# Patient Record
Sex: MALE | Race: ASIAN | Employment: FULL TIME | URBAN - METROPOLITAN AREA
[De-identification: names, ages, dates, MRNs, and addresses within clinical notes are randomized per-mention and may not be internally consistent; named-entity substitution may affect disease eponyms.]

---

## 2023-11-20 ENCOUNTER — APPOINTMENT (OUTPATIENT)
Dept: CT IMAGING | Facility: HOSPITAL | Age: 38
End: 2023-11-20
Attending: EMERGENCY MEDICINE
Payer: COMMERCIAL

## 2023-11-20 ENCOUNTER — HOSPITAL ENCOUNTER (EMERGENCY)
Facility: HOSPITAL | Age: 38
Discharge: HOME | End: 2023-11-20
Attending: EMERGENCY MEDICINE
Payer: COMMERCIAL

## 2023-11-20 ENCOUNTER — HOSPITAL ENCOUNTER (OUTPATIENT)
Age: 38
Discharge: ANOTHER HOSPITAL | End: 2023-11-20
Payer: COMMERCIAL

## 2023-11-20 VITALS
RESPIRATION RATE: 18 BRPM | TEMPERATURE: 98 F | DIASTOLIC BLOOD PRESSURE: 84 MMHG | BODY MASS INDEX: 30.77 KG/M2 | OXYGEN SATURATION: 100 % | HEIGHT: 68 IN | SYSTOLIC BLOOD PRESSURE: 131 MMHG | WEIGHT: 203 LBS | HEART RATE: 67 BPM

## 2023-11-20 VITALS
HEIGHT: 68 IN | TEMPERATURE: 98 F | HEART RATE: 90 BPM | OXYGEN SATURATION: 98 % | RESPIRATION RATE: 18 BRPM | BODY MASS INDEX: 30.41 KG/M2 | DIASTOLIC BLOOD PRESSURE: 90 MMHG | SYSTOLIC BLOOD PRESSURE: 134 MMHG | WEIGHT: 200.63 LBS

## 2023-11-20 DIAGNOSIS — K61.0 PERIANAL ABSCESS: Primary | ICD-10-CM

## 2023-11-20 DIAGNOSIS — K61.1 PERIRECTAL ABSCESS: Primary | ICD-10-CM

## 2023-11-20 LAB
ALBUMIN SERPL-MCNC: 3.9 G/DL (ref 3.4–5)
ALBUMIN/GLOB SERPL: 1 {RATIO} (ref 1–2)
ALP LIVER SERPL-CCNC: 88 U/L
ALT SERPL-CCNC: 38 U/L
ANION GAP SERPL CALC-SCNC: 6 MMOL/L (ref 0–18)
APTT PPP: 28.5 SECONDS (ref 23.3–35.6)
AST SERPL-CCNC: 27 U/L (ref 15–37)
BASOPHILS # BLD AUTO: 0.03 X10(3) UL (ref 0–0.2)
BASOPHILS NFR BLD AUTO: 0.5 %
BILIRUB SERPL-MCNC: 0.6 MG/DL (ref 0.1–2)
BUN BLD-MCNC: 9 MG/DL (ref 9–23)
CALCIUM BLD-MCNC: 9.2 MG/DL (ref 8.5–10.1)
CHLORIDE SERPL-SCNC: 105 MMOL/L (ref 98–112)
CO2 SERPL-SCNC: 25 MMOL/L (ref 21–32)
CREAT BLD-MCNC: 0.9 MG/DL
EGFRCR SERPLBLD CKD-EPI 2021: 113 ML/MIN/1.73M2 (ref 60–?)
EOSINOPHIL # BLD AUTO: 0.12 X10(3) UL (ref 0–0.7)
EOSINOPHIL NFR BLD AUTO: 1.9 %
ERYTHROCYTE [DISTWIDTH] IN BLOOD BY AUTOMATED COUNT: 12.9 %
GLOBULIN PLAS-MCNC: 4 G/DL (ref 2.8–4.4)
GLUCOSE BLD-MCNC: 85 MG/DL (ref 70–99)
HCT VFR BLD AUTO: 44.3 %
HGB BLD-MCNC: 14.8 G/DL
IMM GRANULOCYTES # BLD AUTO: 0.01 X10(3) UL (ref 0–1)
IMM GRANULOCYTES NFR BLD: 0.2 %
INR BLD: 1.07 (ref 0.8–1.2)
LYMPHOCYTES # BLD AUTO: 1.56 X10(3) UL (ref 1–4)
LYMPHOCYTES NFR BLD AUTO: 25.3 %
MCH RBC QN AUTO: 26.5 PG (ref 26–34)
MCHC RBC AUTO-ENTMCNC: 33.4 G/DL (ref 31–37)
MCV RBC AUTO: 79.4 FL
MONOCYTES # BLD AUTO: 0.59 X10(3) UL (ref 0.1–1)
MONOCYTES NFR BLD AUTO: 9.6 %
NEUTROPHILS # BLD AUTO: 3.86 X10 (3) UL (ref 1.5–7.7)
NEUTROPHILS # BLD AUTO: 3.86 X10(3) UL (ref 1.5–7.7)
NEUTROPHILS NFR BLD AUTO: 62.5 %
OSMOLALITY SERPL CALC.SUM OF ELEC: 280 MOSM/KG (ref 275–295)
PLATELET # BLD AUTO: 249 10(3)UL (ref 150–450)
POTASSIUM SERPL-SCNC: 4.1 MMOL/L (ref 3.5–5.1)
PROT SERPL-MCNC: 7.9 G/DL (ref 6.4–8.2)
PROTHROMBIN TIME: 13.9 SECONDS (ref 11.6–14.8)
RBC # BLD AUTO: 5.58 X10(6)UL
SODIUM SERPL-SCNC: 136 MMOL/L (ref 136–145)
WBC # BLD AUTO: 6.2 X10(3) UL (ref 4–11)

## 2023-11-20 PROCEDURE — 85610 PROTHROMBIN TIME: CPT | Performed by: EMERGENCY MEDICINE

## 2023-11-20 PROCEDURE — 99215 OFFICE O/P EST HI 40 MIN: CPT | Performed by: PHYSICIAN ASSISTANT

## 2023-11-20 PROCEDURE — 96361 HYDRATE IV INFUSION ADD-ON: CPT

## 2023-11-20 PROCEDURE — 80053 COMPREHEN METABOLIC PANEL: CPT | Performed by: EMERGENCY MEDICINE

## 2023-11-20 PROCEDURE — 74177 CT ABD & PELVIS W/CONTRAST: CPT | Performed by: EMERGENCY MEDICINE

## 2023-11-20 PROCEDURE — 85025 COMPLETE CBC W/AUTO DIFF WBC: CPT | Performed by: EMERGENCY MEDICINE

## 2023-11-20 PROCEDURE — 85730 THROMBOPLASTIN TIME PARTIAL: CPT | Performed by: EMERGENCY MEDICINE

## 2023-11-20 PROCEDURE — 96365 THER/PROPH/DIAG IV INF INIT: CPT

## 2023-11-20 PROCEDURE — 82272 OCCULT BLD FECES 1-3 TESTS: CPT

## 2023-11-20 PROCEDURE — 99284 EMERGENCY DEPT VISIT MOD MDM: CPT

## 2023-11-20 PROCEDURE — 96375 TX/PRO/DX INJ NEW DRUG ADDON: CPT

## 2023-11-20 RX ORDER — AMOXICILLIN AND CLAVULANATE POTASSIUM 875; 125 MG/1; MG/1
1 TABLET, FILM COATED ORAL 2 TIMES DAILY
Qty: 20 TABLET | Refills: 0 | Status: SHIPPED | OUTPATIENT
Start: 2023-11-20 | End: 2023-11-30

## 2023-11-20 RX ORDER — KETOROLAC TROMETHAMINE 15 MG/ML
30 INJECTION, SOLUTION INTRAMUSCULAR; INTRAVENOUS ONCE
Status: COMPLETED | OUTPATIENT
Start: 2023-11-20 | End: 2023-11-20

## 2023-11-20 RX ORDER — HYDROCODONE BITARTRATE AND ACETAMINOPHEN 5; 325 MG/1; MG/1
1 TABLET ORAL EVERY 6 HOURS PRN
Qty: 10 TABLET | Refills: 0 | Status: SHIPPED | OUTPATIENT
Start: 2023-11-20

## 2023-11-20 RX ORDER — SODIUM CHLORIDE 9 MG/ML
1000 INJECTION, SOLUTION INTRAVENOUS ONCE
Status: COMPLETED | OUTPATIENT
Start: 2023-11-20 | End: 2023-11-20

## 2023-11-20 NOTE — DISCHARGE INSTRUCTIONS
Do not eat or drink   Report directly to the ER - concern for perirectal abscess, although already draining imagining may be necessary to assess the extent of the swelling

## 2023-11-20 NOTE — ED INITIAL ASSESSMENT (HPI)
Seen & treated at another IC 10/28. Hx of constipation also hemorrhoids. Has been using Hydrocortisone cream & lidocaine ointment. Also stool softner. Meera Elkins to another [facility] ED earlier today but left prior to discharge got tired of waiting. .  Still having rectal pain w bleeding.   Denies N/V/D or UTI issues

## 2023-11-21 NOTE — DISCHARGE INSTRUCTIONS
Soak in warm sitz baths several times a day at home  Motrin 600 mg every 6 hours for pain at home  Return to the ER if symptoms worsen or if any other problems arise

## 2023-11-22 ENCOUNTER — OFFICE VISIT (OUTPATIENT)
Dept: SURGERY | Facility: CLINIC | Age: 38
End: 2023-11-22
Payer: COMMERCIAL

## 2023-11-22 VITALS — TEMPERATURE: 97 F | HEART RATE: 85 BPM

## 2023-11-22 DIAGNOSIS — K61.0 PERIANAL ABSCESS: Primary | ICD-10-CM

## 2023-11-22 PROCEDURE — 46050 I&D PERIANAL ABSCESS SUPFC: CPT | Performed by: SURGERY

## 2023-11-22 PROCEDURE — 99244 OFF/OP CNSLTJ NEW/EST MOD 40: CPT | Performed by: SURGERY

## 2023-11-23 PROBLEM — K61.0 PERIANAL ABSCESS: Status: ACTIVE | Noted: 2023-11-23

## 2023-11-24 ENCOUNTER — TELEPHONE (OUTPATIENT)
Facility: LOCATION | Age: 38
End: 2023-11-24

## 2023-11-24 DIAGNOSIS — R19.5 WATERY STOOLS: Primary | ICD-10-CM

## 2023-11-24 NOTE — TELEPHONE ENCOUNTER
Brian Enamorado underwent incision and drainage of perirectal abscess in the office on 11/22/23 with Dr. Clay Owens. The patient was prescribed Augmentin while in the hospital on 11/20/23. Dr. Clay Owens recommended continuing the medication as prescribed after his office procedure. The patient states he began to experience loose and completely watery bowel movements 2 days ago. He reports having 4-5 bowel movements a day. He stopped taking the Augmentin when the watery bowel movements began. Today, he reports normal formed stool. He denies taking antibiotics today. He denies fevers or chills. An order for C. Diff stool sample was placed. I advised the patient to complete this today. We will notify him of the next steps based off of the results. All of the patient's questions and concerns were addressed. He expressed his understanding and is in agreement with this plan.      Omid Love PA-C

## 2023-11-24 NOTE — TELEPHONE ENCOUNTER
Good morning,  The patient recently called in about the medication that he was prescribed while at the ER on 11/20/23 and when he came in to see . The patient states that the medication is causing him to have diarrhea and he was wondering if there was a substitute that he can take.     Reason for Visit : Perianal Abscess    Medication : Amoxicillin Clavulanate 875-125 MG Oral Tab     Call back # [phone]

## 2023-11-29 ENCOUNTER — OFFICE VISIT (OUTPATIENT)
Facility: LOCATION | Age: 38
End: 2023-11-29
Payer: COMMERCIAL

## 2023-11-29 VITALS — HEART RATE: 83 BPM | TEMPERATURE: 98 F

## 2023-11-29 DIAGNOSIS — K61.0 PERIANAL ABSCESS: Primary | ICD-10-CM

## 2023-11-29 PROCEDURE — 99212 OFFICE O/P EST SF 10 MIN: CPT | Performed by: SURGERY

## 2023-12-12 ENCOUNTER — OFFICE VISIT (OUTPATIENT)
Dept: FAMILY MEDICINE CLINIC | Facility: CLINIC | Age: 38
End: 2023-12-12
Payer: COMMERCIAL

## 2023-12-12 VITALS
OXYGEN SATURATION: 99 % | HEIGHT: 68 IN | HEART RATE: 81 BPM | SYSTOLIC BLOOD PRESSURE: 122 MMHG | DIASTOLIC BLOOD PRESSURE: 84 MMHG | BODY MASS INDEX: 30.16 KG/M2 | WEIGHT: 199 LBS

## 2023-12-12 DIAGNOSIS — Z23 NEED FOR VACCINATION: ICD-10-CM

## 2023-12-12 DIAGNOSIS — Z00.00 ANNUAL PHYSICAL EXAM: Primary | ICD-10-CM

## 2023-12-12 DIAGNOSIS — G47.33 OSA (OBSTRUCTIVE SLEEP APNEA): ICD-10-CM

## 2023-12-12 DIAGNOSIS — Z23 NEED FOR TDAP VACCINATION: ICD-10-CM

## 2023-12-12 DIAGNOSIS — I10 PRIMARY HYPERTENSION: ICD-10-CM

## 2023-12-12 PROCEDURE — 3008F BODY MASS INDEX DOCD: CPT | Performed by: FAMILY MEDICINE

## 2023-12-12 PROCEDURE — 90471 IMMUNIZATION ADMIN: CPT | Performed by: FAMILY MEDICINE

## 2023-12-12 PROCEDURE — 90715 TDAP VACCINE 7 YRS/> IM: CPT | Performed by: FAMILY MEDICINE

## 2023-12-12 PROCEDURE — 3079F DIAST BP 80-89 MM HG: CPT | Performed by: FAMILY MEDICINE

## 2023-12-12 PROCEDURE — 3074F SYST BP LT 130 MM HG: CPT | Performed by: FAMILY MEDICINE

## 2023-12-12 PROCEDURE — 90686 IIV4 VACC NO PRSV 0.5 ML IM: CPT | Performed by: FAMILY MEDICINE

## 2023-12-12 PROCEDURE — 99385 PREV VISIT NEW AGE 18-39: CPT | Performed by: FAMILY MEDICINE

## 2023-12-12 PROCEDURE — 90472 IMMUNIZATION ADMIN EACH ADD: CPT | Performed by: FAMILY MEDICINE

## 2023-12-12 RX ORDER — TELMISARTAN 40 MG/1
40 TABLET ORAL DAILY
Qty: 90 TABLET | Refills: 3 | Status: SHIPPED | OUTPATIENT
Start: 2023-12-12

## 2023-12-12 RX ORDER — ERGOCALCIFEROL 1.25 MG/1
50000 CAPSULE ORAL WEEKLY
COMMUNITY

## 2023-12-12 RX ORDER — TELMISARTAN 40 MG/1
40 TABLET ORAL DAILY
COMMUNITY

## 2023-12-13 ENCOUNTER — OFFICE VISIT (OUTPATIENT)
Facility: LOCATION | Age: 38
End: 2023-12-13
Payer: COMMERCIAL

## 2023-12-13 DIAGNOSIS — K61.0 PERIANAL ABSCESS: Primary | ICD-10-CM

## 2023-12-13 PROCEDURE — 99212 OFFICE O/P EST SF 10 MIN: CPT | Performed by: SURGERY

## 2023-12-13 NOTE — PROGRESS NOTES
Follow Up Visit Note       Active Problems      1. Perianal abscess          Chief Complaint   Chief Complaint   Patient presents with    [facility] Care     1 wk f/u- i&d perirectal abscess in office         History of Present Illness    The patient presents for continued follow-up after incision and drainage of perirectal abscess in the office. The patient states he has intermittent discomfort and clear drainage from the area. No other complaints offered. Allergies  Drew has No Known Allergies. Past Medical / Surgical / Social / Family History    The past medical and past surgical history have been reviewed by me today. History reviewed. No pertinent past medical history. History reviewed. No pertinent surgical history. The family history and social history have been reviewed by me today. Family History   Problem Relation Age of Onset    Diabetes Mother     Infectious Disease Mother      Social History     Socioeconomic History    Marital status:    Tobacco Use    Smoking status: Never     Passive exposure: Never    Smokeless tobacco: Never   Substance and Sexual Activity    Alcohol use: Yes     Alcohol/week: 5.0 standard drinks of alcohol     Types: 2 Glasses of wine, 2 Cans of beer, 1 Shots of liquor per week    Drug use: Never   Other Topics Concern    Caffeine Concern No    Exercise No    Seat Belt No    Special Diet No    Stress Concern No    Weight Concern No        Current Outpatient Medications:     telmisartan 40 MG Oral Tab, Take 1 tablet (40 mg total) by mouth daily. , Disp: , Rfl:     ergocalciferol 1.25 MG (02506 UT) Oral Cap, Take 1 capsule (50,000 Units total) by mouth once a week., Disp: , Rfl:     telmisartan 40 MG Oral Tab, Take 1 tablet (40 mg total) by mouth daily. , Disp: 90 tablet, Rfl: 3     Review of Systems  The Review of Systems has been reviewed by me during today.   Review of Systems   Constitutional:  Negative for chills, diaphoresis, fatigue, fever and unexpected weight change. HENT:  Negative for hearing loss, nosebleeds, sore throat and trouble swallowing. Respiratory:  Negative for apnea, cough, shortness of breath and wheezing. Cardiovascular:  Negative for chest pain, palpitations and leg swelling. Gastrointestinal:  Negative for abdominal distention, abdominal pain, anal bleeding, blood in stool, constipation, diarrhea, nausea and vomiting. Genitourinary:  Negative for difficulty urinating, dysuria, frequency and urgency. Musculoskeletal:  Negative for arthralgias and myalgias. Skin:  Negative for color change and rash. Neurological:  Negative for tremors, syncope and weakness. Hematological:  Negative for adenopathy. Does not bruise/bleed easily. Psychiatric/Behavioral:  Negative for behavioral problems and sleep disturbance. Physical Findings   There were no vitals taken for this visit. Physical Exam  Constitutional:       Appearance: He is well-developed. HENT:      Head: Normocephalic and atraumatic. Eyes:      General: No scleral icterus. Neck:      Trachea: No tracheal deviation. Genitourinary:     Rectum: No mass, tenderness, anal fissure, external hemorrhoid or internal hemorrhoid. Normal anal tone. Comments:   Anal Sphincter Intact  No Pruritis Ani  No Lichenification  No Abscess  No Fistula in ano  No Anterior Fissure  No Posterior Fissure      Skin:     General: Skin is warm and dry. Neurological:      Mental Status: He is alert and oriented to person, place, and time. Psychiatric:         Speech: Speech normal.         Behavior: Behavior normal. Behavior is cooperative. Thought Content: Thought content normal.         Judgment: Judgment normal.          Assessment   1. Perianal abscess        Plan     The patient continues to improve following incision and drainage of perianal abscess. Anticipated wound healing continues.   Mild, expected drainage persists from area of minor hypertrophic granulation tissue  No active infection, undrained fluid or abscess now. Local care discussed including analgesic options. Follow-up in 2 weeks for continued care. The patient was provided ample opportunity to ask questions. All of the patient's questions were answered in detail. The patient voiced understanding of the care plan. No orders of the defined types were placed in this encounter. Imaging & Referrals   None    Follow Up  No follow-ups on file.     Jessica Warren MD

## 2024-01-04 ENCOUNTER — OFFICE VISIT (OUTPATIENT)
Facility: LOCATION | Age: 39
End: 2024-01-04
Payer: COMMERCIAL

## 2024-01-04 ENCOUNTER — LAB ENCOUNTER (OUTPATIENT)
Dept: LAB | Age: 39
End: 2024-01-04
Attending: FAMILY MEDICINE
Payer: COMMERCIAL

## 2024-01-04 VITALS — HEART RATE: 74 BPM | TEMPERATURE: 97 F

## 2024-01-04 DIAGNOSIS — K61.0 PERIANAL ABSCESS: Primary | ICD-10-CM

## 2024-01-04 DIAGNOSIS — Z00.00 ANNUAL PHYSICAL EXAM: ICD-10-CM

## 2024-01-04 LAB
ALBUMIN SERPL-MCNC: 4.3 G/DL (ref 3.4–5)
ALBUMIN/GLOB SERPL: 1.2 {RATIO} (ref 1–2)
ALP LIVER SERPL-CCNC: 85 U/L
ALT SERPL-CCNC: 43 U/L
ANION GAP SERPL CALC-SCNC: 6 MMOL/L (ref 0–18)
AST SERPL-CCNC: 29 U/L (ref 15–37)
BASOPHILS # BLD AUTO: 0.04 X10(3) UL (ref 0–0.2)
BASOPHILS NFR BLD AUTO: 0.8 %
BILIRUB SERPL-MCNC: 0.6 MG/DL (ref 0.1–2)
BUN BLD-MCNC: 8 MG/DL (ref 9–23)
CALCIUM BLD-MCNC: 8.9 MG/DL (ref 8.5–10.1)
CHLORIDE SERPL-SCNC: 106 MMOL/L (ref 98–112)
CHOLEST SERPL-MCNC: 156 MG/DL (ref ?–200)
CO2 SERPL-SCNC: 25 MMOL/L (ref 21–32)
CREAT BLD-MCNC: 0.8 MG/DL
EGFRCR SERPLBLD CKD-EPI 2021: 117 ML/MIN/1.73M2 (ref 60–?)
EOSINOPHIL # BLD AUTO: 0.23 X10(3) UL (ref 0–0.7)
EOSINOPHIL NFR BLD AUTO: 4.8 %
ERYTHROCYTE [DISTWIDTH] IN BLOOD BY AUTOMATED COUNT: 13.7 %
FASTING PATIENT LIPID ANSWER: YES
FASTING STATUS PATIENT QL REPORTED: YES
GLOBULIN PLAS-MCNC: 3.7 G/DL (ref 2.8–4.4)
GLUCOSE BLD-MCNC: 87 MG/DL (ref 70–99)
HCT VFR BLD AUTO: 47.8 %
HDLC SERPL-MCNC: 47 MG/DL (ref 40–59)
HGB BLD-MCNC: 15 G/DL
IMM GRANULOCYTES # BLD AUTO: 0.02 X10(3) UL (ref 0–1)
IMM GRANULOCYTES NFR BLD: 0.4 %
LDLC SERPL CALC-MCNC: 81 MG/DL (ref ?–100)
LYMPHOCYTES # BLD AUTO: 1.69 X10(3) UL (ref 1–4)
LYMPHOCYTES NFR BLD AUTO: 35.2 %
MCH RBC QN AUTO: 26.6 PG (ref 26–34)
MCHC RBC AUTO-ENTMCNC: 31.4 G/DL (ref 31–37)
MCV RBC AUTO: 84.8 FL
MONOCYTES # BLD AUTO: 0.4 X10(3) UL (ref 0.1–1)
MONOCYTES NFR BLD AUTO: 8.3 %
NEUTROPHILS # BLD AUTO: 2.42 X10 (3) UL (ref 1.5–7.7)
NEUTROPHILS # BLD AUTO: 2.42 X10(3) UL (ref 1.5–7.7)
NEUTROPHILS NFR BLD AUTO: 50.5 %
NONHDLC SERPL-MCNC: 109 MG/DL (ref ?–130)
OSMOLALITY SERPL CALC.SUM OF ELEC: 282 MOSM/KG (ref 275–295)
PLATELET # BLD AUTO: 249 10(3)UL (ref 150–450)
POTASSIUM SERPL-SCNC: 4.1 MMOL/L (ref 3.5–5.1)
PROT SERPL-MCNC: 8 G/DL (ref 6.4–8.2)
RBC # BLD AUTO: 5.64 X10(6)UL
SODIUM SERPL-SCNC: 137 MMOL/L (ref 136–145)
TRIGL SERPL-MCNC: 164 MG/DL (ref 30–149)
TSI SER-ACNC: 2.45 MIU/ML (ref 0.36–3.74)
VIT D+METAB SERPL-MCNC: 21.7 NG/ML (ref 30–100)
VLDLC SERPL CALC-MCNC: 26 MG/DL (ref 0–30)
WBC # BLD AUTO: 4.8 X10(3) UL (ref 4–11)

## 2024-01-04 PROCEDURE — 84443 ASSAY THYROID STIM HORMONE: CPT

## 2024-01-04 PROCEDURE — 80053 COMPREHEN METABOLIC PANEL: CPT

## 2024-01-04 PROCEDURE — 80061 LIPID PANEL: CPT

## 2024-01-04 PROCEDURE — 82306 VITAMIN D 25 HYDROXY: CPT

## 2024-01-04 PROCEDURE — 99212 OFFICE O/P EST SF 10 MIN: CPT | Performed by: SURGERY

## 2024-01-04 PROCEDURE — 85025 COMPLETE CBC W/AUTO DIFF WBC: CPT

## 2024-01-04 PROCEDURE — 17250 CHEM CAUT OF GRANLTJ TISSUE: CPT | Performed by: SURGERY

## 2024-01-30 ENCOUNTER — OFFICE VISIT (OUTPATIENT)
Facility: LOCATION | Age: 39
End: 2024-01-30
Payer: COMMERCIAL

## 2024-01-30 VITALS — HEART RATE: 68 BPM | TEMPERATURE: 99 F

## 2024-01-30 DIAGNOSIS — K61.0 PERIANAL ABSCESS: Primary | ICD-10-CM

## 2024-01-30 PROCEDURE — 99212 OFFICE O/P EST SF 10 MIN: CPT | Performed by: SURGERY

## 2024-01-30 NOTE — PROGRESS NOTES
Follow Up Visit Note       Active Problems      1. Perianal abscess          Chief Complaint   Chief Complaint   Patient presents with    [facility] Care     EP - F/U I&D Perirectal abscess, pain, discomfort, bleeding, drainage, painful to sit, no other symptoms.         History of Present Illness    Patient presents for continued follow-up after drainage of perirectal abscess.  The patient continues to complain of recurrent drainage that is mucopurulent as well as discomfort and occasional bleeding.    I had a lengthy discussion regarding case management with the patient.  We discussed operative versus nonoperative alternatives.  After careful consideration, the patient wishes second opinion from colorectal surgeon.  I have referred the patient to Dr. Fco Schulte for second opinion.    Allergies  Drew has No Known Allergies.    Past Medical / Surgical / Social / Family History    The past medical and past surgical history have been reviewed by me today.    History reviewed. No pertinent past medical history.  History reviewed. No pertinent surgical history.    The family history and social history have been reviewed by me today.    Family History   Problem Relation Age of Onset    Diabetes Mother     Infectious Disease Mother      Social History     Socioeconomic History    Marital status:    Tobacco Use    Smoking status: Never     Passive exposure: Never    Smokeless tobacco: Never   Substance and Sexual Activity    Alcohol use: Yes     Alcohol/week: 5.0 standard drinks of alcohol     Types: 2 Glasses of wine, 2 Cans of beer, 1 Shots of liquor per week    Drug use: Never   Other Topics Concern    Caffeine Concern No    Exercise No    Seat Belt No    Special Diet No    Stress Concern No    Weight Concern No        Current Outpatient Medications:     telmisartan 40 MG Oral Tab, Take 1 tablet (40 mg total) by mouth daily., Disp: , Rfl:     ergocalciferol 1.25 MG (73556 UT) Oral Cap, Take 1 capsule (50,000  Units total) by mouth once a week., Disp: , Rfl:     telmisartan 40 MG Oral Tab, Take 1 tablet (40 mg total) by mouth daily., Disp: 90 tablet, Rfl: 3     Review of Systems  The Review of Systems has been reviewed by me during today.  Review of Systems   Constitutional: Negative.    HENT: Negative.     Eyes: Negative.    Respiratory: Negative.     Cardiovascular: Negative.    Gastrointestinal: Negative.    Genitourinary: Negative.    Musculoskeletal: Negative.    Skin: Negative.    Neurological: Negative.    Psychiatric/Behavioral: Negative.          Physical Findings   Pulse 68   Temp 98.6 °F (37 °C) (Temporal)   Physical Exam  Constitutional:       Appearance: He is well-developed.   HENT:      Head: Normocephalic and atraumatic.   Eyes:      General: No scleral icterus.  Neck:      Trachea: No tracheal deviation.   Genitourinary:     Prostate: Not enlarged and not tender.      Rectum: No mass, tenderness, anal fissure, external hemorrhoid or internal hemorrhoid. Normal anal tone.          Comments: No Prostate Nodule  Anal Sphincter Intact  No Pruritis Ani  No Lichenification  No Abscess  No Fistula in ano  No Anterior Fissure  No Posterior Fissure        Skin:     General: Skin is warm and dry.   Neurological:      Mental Status: He is alert and oriented to person, place, and time.   Psychiatric:         Speech: Speech normal.         Behavior: Behavior normal. Behavior is cooperative.         Thought Content: Thought content normal.         Judgment: Judgment normal.          Assessment   1. Perianal abscess        Plan     The patient has persistent drainage and discomfort in the area of perianal abscess drainage.  Although I offered evaluation under anesthesia and possible surgical intervention, the patient wishes a second opinion  My office facilitated a appointment with Dr. Schulte for second opinion as a colorectal surgeon.  I provided samples of RectiCare for the patient in hopes of providing topical  analgesia to the area of healing.  Patient will return to my attention if he wishes further evaluation management by me, otherwise, he is welcome to transition his care to Dr. Schulte.    The patient was provided ample opportunity to ask questions.  All of the patient's questions were answered in detail.  The patient voiced understanding of the care plan.     No orders of the defined types were placed in this encounter.      Imaging & Referrals   None    Follow Up  No follow-ups on file.    Luis Felipe Puga MD

## 2024-02-05 ENCOUNTER — OFFICE VISIT (OUTPATIENT)
Facility: LOCATION | Age: 39
End: 2024-02-05
Payer: COMMERCIAL

## 2024-02-05 ENCOUNTER — TELEPHONE (OUTPATIENT)
Facility: LOCATION | Age: 39
End: 2024-02-05

## 2024-02-05 VITALS — TEMPERATURE: 98 F | HEART RATE: 74 BPM

## 2024-02-05 DIAGNOSIS — K60.3 ANAL FISTULA: Primary | ICD-10-CM

## 2024-02-05 NOTE — H&P
New Patient Visit Note       Active Problems      1. Anal fistula        Chief Complaint   Chief Complaint   Patient presents with    New Patient     NP - Perianal Abscess - Pt states wound is draining a yellow fluid with odor, Pt states he is changing the gauze 3-4 x day, Pt c/o irritation from the gauze, Pt denies any fevers, Pt states pain is 8/10        History of Present Illness   This is a very nice 39-year-old gentleman referred to me from my partner, Dr. Puga, as a colorectal specialist in regards to a nonhealing wound after incision and drainage of a perianal abscess and concern for anal fistula.  Patient was seen in urgent care and the emergency room on 11/20/2023 in regards to anorectal pain.  He was diagnosed with a small abscess and was discharged home with a course of antibiotics.  He saw Dr. Puga on 11/22/2023 and Dr. Puga was performed a bedside incision and drainage at that time.  He has been seeing Dr. Puga about every 2 weeks since that time for ongoing care and wound checks.    Patient continues to have ongoing, intermittent anorectal pain.  He states at times the pain is nonexistent and other times it is severe as high as 10 out of 10 in intensity.  He continues to have drainage from the I&D wound that is typically thick yellow in color and he describes it as pus.  He also has bleeding from the wound at times.  He has noticed that he has passed flatus through the wound.  This has been his first lifetime perianal abscess.  No prior anorectal surgery.  He denies any abdominal pain, diarrhea or rectal bleeding.  No recent fevers.  No incontinence.  No recent antibiotics.    Allergies  Drew has No Known Allergies.    Past Medical / Surgical / Social / Family History    The past medical and past surgical history have been reviewed by me today.    History reviewed. No pertinent past medical history.  History reviewed. No pertinent surgical history.    The family history and social  history have been reviewed by me today.    Family History   Problem Relation Age of Onset    Diabetes Mother     Infectious Disease Mother      Social History     Socioeconomic History    Marital status:    Tobacco Use    Smoking status: Never     Passive exposure: Never    Smokeless tobacco: Never   Substance and Sexual Activity    Alcohol use: Yes     Alcohol/week: 5.0 standard drinks of alcohol     Types: 2 Glasses of wine, 2 Cans of beer, 1 Shots of liquor per week    Drug use: Never   Other Topics Concern    Caffeine Concern No    Exercise No    Seat Belt No    Special Diet No    Stress Concern No    Weight Concern No        Current Outpatient Medications:     telmisartan 40 MG Oral Tab, Take 1 tablet (40 mg total) by mouth daily., Disp: , Rfl:     ergocalciferol 1.25 MG (58457 UT) Oral Cap, Take 1 capsule (50,000 Units total) by mouth once a week., Disp: , Rfl:     telmisartan 40 MG Oral Tab, Take 1 tablet (40 mg total) by mouth daily., Disp: 90 tablet, Rfl: 3      Review of Systems  A 10 point review of systems was performed and negative unless otherwise documented per HPI.    Physical Findings   Pulse 74   Temp 98.1 °F (36.7 °C)   Physical Exam  Vitals and nursing note reviewed. Exam conducted with a chaperone present.   Constitutional:       General: He is not in acute distress.  HENT:      Head: Normocephalic and atraumatic.      Mouth/Throat:      Mouth: Mucous membranes are moist.   Cardiovascular:      Rate and Rhythm: Normal rate and regular rhythm.   Pulmonary:      Effort: Pulmonary effort is normal.   Abdominal:      General: There is no distension.      Palpations: Abdomen is soft.      Tenderness: There is no abdominal tenderness.   Genitourinary:     Comments: Patient examined in the prone jackknife position with medical assistant chaperone present.  There is a thin radial wound in the left anterior anoderm beginning just outside the anal verge extending around 2-3 cm from the anal verge.   The wound base is filled with raised granulation tissue.  The area is tender to palpation and I am able to express purulent fluid from the center of the wound.  I attempted digital rectal examination that was limited due to patient discomfort.  Patient has very strong sphincter tone.  No obvious palpable internal openings.  Musculoskeletal:         General: No deformity.   Skin:     General: Skin is warm and dry.   Neurological:      Mental Status: He is alert.   Psychiatric:         Mood and Affect: Mood normal.             Assessment   1. Anal fistula      This is a very nice 39-year-old gentleman referred to me from my partner, Dr. Puga, as a colorectal specialist in regards to a nonhealing wound after incision and drainage of a perianal abscess and concern for anal fistula.  Patient was seen in urgent care and the emergency room on 11/20/2023 in regards to anorectal pain.  He was diagnosed with a small abscess and was discharged home with a course of antibiotics.  He saw Dr. Puga on 11/22/2023 and Dr. Puga was performed a bedside incision and drainage at that time.  He has been seeing Dr. Puga about every 2 weeks since that time for ongoing care and wound checks.    Patient continues to have ongoing, intermittent anorectal pain.  He states at times the pain is nonexistent and other times it is severe as high as 10 out of 10 in intensity.  He continues to have drainage from the I&D wound that is typically thick yellow in color and he describes it as pus.  He also has bleeding from the wound at times.  He has noticed that he has passed flatus through the wound.  This has been his first lifetime perianal abscess.  No prior anorectal surgery.  He denies any abdominal pain, diarrhea or rectal bleeding.  No recent fevers.  No incontinence.  No recent antibiotics.    On bedside exam, there is a thin radial wound in the left anterior anoderm beginning just outside the anal verge extending around 2-3 cm  from the anal verge.  The wound base is filled with raised granulation tissue.  The area is tender to palpation and I am able to express purulent fluid from the center of the wound.  I attempted digital rectal examination that was limited due to patient discomfort.  Patient has very strong sphincter tone.  No obvious palpable internal openings.    Overall, patient's symptoms and exam findings are suspicious for an anal fistula    Plan  I discussed my impression with the patient that I am concerned about an anal fistula.  I recommend planning to go to the operating room for anal exam under anesthesia with anoscopy and peroxide fistulogram with possible fistulotomy, possible seton placement depending on intraoperative findings.  The details of this surgery were discussed including the expected recovery time, risks, benefits and alternatives.  Patient expressed understanding and was agreeable to schedule surgery with me.  Surgery has been scheduled for 1/23/2024.  I did  patient that he may need more surgery down the road if there is evidence of a high transsphincteric anal fistula.     No orders of the defined types were placed in this encounter.      Imaging & Referrals   None    Follow Up  No follow-ups on file.    Fco Schulte MD

## 2024-02-05 NOTE — H&P (VIEW-ONLY)
New Patient Visit Note       Active Problems      1. Anal fistula        Chief Complaint   Chief Complaint   Patient presents with    New Patient     NP - Perianal Abscess - Pt states wound is draining a yellow fluid with odor, Pt states he is changing the gauze 3-4 x day, Pt c/o irritation from the gauze, Pt denies any fevers, Pt states pain is 8/10        History of Present Illness   This is a very nice 39-year-old gentleman referred to me from my partner, Dr. Puga, as a colorectal specialist in regards to a nonhealing wound after incision and drainage of a perianal abscess and concern for anal fistula.  Patient was seen in urgent care and the emergency room on 11/20/2023 in regards to anorectal pain.  He was diagnosed with a small abscess and was discharged home with a course of antibiotics.  He saw Dr. Puga on 11/22/2023 and Dr. Puga was performed a bedside incision and drainage at that time.  He has been seeing Dr. Puga about every 2 weeks since that time for ongoing care and wound checks.    Patient continues to have ongoing, intermittent anorectal pain.  He states at times the pain is nonexistent and other times it is severe as high as 10 out of 10 in intensity.  He continues to have drainage from the I&D wound that is typically thick yellow in color and he describes it as pus.  He also has bleeding from the wound at times.  He has noticed that he has passed flatus through the wound.  This has been his first lifetime perianal abscess.  No prior anorectal surgery.  He denies any abdominal pain, diarrhea or rectal bleeding.  No recent fevers.  No incontinence.  No recent antibiotics.    Allergies  Drew has No Known Allergies.    Past Medical / Surgical / Social / Family History    The past medical and past surgical history have been reviewed by me today.    History reviewed. No pertinent past medical history.  History reviewed. No pertinent surgical history.    The family history and social  history have been reviewed by me today.    Family History   Problem Relation Age of Onset    Diabetes Mother     Infectious Disease Mother      Social History     Socioeconomic History    Marital status:    Tobacco Use    Smoking status: Never     Passive exposure: Never    Smokeless tobacco: Never   Substance and Sexual Activity    Alcohol use: Yes     Alcohol/week: 5.0 standard drinks of alcohol     Types: 2 Glasses of wine, 2 Cans of beer, 1 Shots of liquor per week    Drug use: Never   Other Topics Concern    Caffeine Concern No    Exercise No    Seat Belt No    Special Diet No    Stress Concern No    Weight Concern No        Current Outpatient Medications:     telmisartan 40 MG Oral Tab, Take 1 tablet (40 mg total) by mouth daily., Disp: , Rfl:     ergocalciferol 1.25 MG (44284 UT) Oral Cap, Take 1 capsule (50,000 Units total) by mouth once a week., Disp: , Rfl:     telmisartan 40 MG Oral Tab, Take 1 tablet (40 mg total) by mouth daily., Disp: 90 tablet, Rfl: 3      Review of Systems  A 10 point review of systems was performed and negative unless otherwise documented per HPI.    Physical Findings   Pulse 74   Temp 98.1 °F (36.7 °C)   Physical Exam  Vitals and nursing note reviewed. Exam conducted with a chaperone present.   Constitutional:       General: He is not in acute distress.  HENT:      Head: Normocephalic and atraumatic.      Mouth/Throat:      Mouth: Mucous membranes are moist.   Cardiovascular:      Rate and Rhythm: Normal rate and regular rhythm.   Pulmonary:      Effort: Pulmonary effort is normal.   Abdominal:      General: There is no distension.      Palpations: Abdomen is soft.      Tenderness: There is no abdominal tenderness.   Genitourinary:     Comments: Patient examined in the prone jackknife position with medical assistant chaperone present.  There is a thin radial wound in the left anterior anoderm beginning just outside the anal verge extending around 2-3 cm from the anal verge.   The wound base is filled with raised granulation tissue.  The area is tender to palpation and I am able to express purulent fluid from the center of the wound.  I attempted digital rectal examination that was limited due to patient discomfort.  Patient has very strong sphincter tone.  No obvious palpable internal openings.  Musculoskeletal:         General: No deformity.   Skin:     General: Skin is warm and dry.   Neurological:      Mental Status: He is alert.   Psychiatric:         Mood and Affect: Mood normal.             Assessment   1. Anal fistula      This is a very nice 39-year-old gentleman referred to me from my partner, Dr. Puga, as a colorectal specialist in regards to a nonhealing wound after incision and drainage of a perianal abscess and concern for anal fistula.  Patient was seen in urgent care and the emergency room on 11/20/2023 in regards to anorectal pain.  He was diagnosed with a small abscess and was discharged home with a course of antibiotics.  He saw Dr. Puga on 11/22/2023 and Dr. Puga was performed a bedside incision and drainage at that time.  He has been seeing Dr. Puga about every 2 weeks since that time for ongoing care and wound checks.    Patient continues to have ongoing, intermittent anorectal pain.  He states at times the pain is nonexistent and other times it is severe as high as 10 out of 10 in intensity.  He continues to have drainage from the I&D wound that is typically thick yellow in color and he describes it as pus.  He also has bleeding from the wound at times.  He has noticed that he has passed flatus through the wound.  This has been his first lifetime perianal abscess.  No prior anorectal surgery.  He denies any abdominal pain, diarrhea or rectal bleeding.  No recent fevers.  No incontinence.  No recent antibiotics.    On bedside exam, there is a thin radial wound in the left anterior anoderm beginning just outside the anal verge extending around 2-3 cm  from the anal verge.  The wound base is filled with raised granulation tissue.  The area is tender to palpation and I am able to express purulent fluid from the center of the wound.  I attempted digital rectal examination that was limited due to patient discomfort.  Patient has very strong sphincter tone.  No obvious palpable internal openings.    Overall, patient's symptoms and exam findings are suspicious for an anal fistula    Plan  I discussed my impression with the patient that I am concerned about an anal fistula.  I recommend planning to go to the operating room for anal exam under anesthesia with anoscopy and peroxide fistulogram with possible fistulotomy, possible seton placement depending on intraoperative findings.  The details of this surgery were discussed including the expected recovery time, risks, benefits and alternatives.  Patient expressed understanding and was agreeable to schedule surgery with me.  Surgery has been scheduled for 1/23/2024.  I did  patient that he may need more surgery down the road if there is evidence of a high transsphincteric anal fistula.     No orders of the defined types were placed in this encounter.      Imaging & Referrals   None    Follow Up  No follow-ups on file.    Fco Schulte MD

## 2024-02-09 ENCOUNTER — EKG ENCOUNTER (OUTPATIENT)
Dept: LAB | Facility: HOSPITAL | Age: 39
End: 2024-02-09
Attending: STUDENT IN AN ORGANIZED HEALTH CARE EDUCATION/TRAINING PROGRAM
Payer: COMMERCIAL

## 2024-02-09 DIAGNOSIS — R19.5 WATERY STOOLS: ICD-10-CM

## 2024-02-09 DIAGNOSIS — K61.0 PERIANAL ABSCESS: ICD-10-CM

## 2024-02-09 LAB
ATRIAL RATE: 92 BPM
P AXIS: 55 DEGREES
P-R INTERVAL: 148 MS
Q-T INTERVAL: 346 MS
QRS DURATION: 82 MS
QTC CALCULATION (BEZET): 427 MS
R AXIS: 40 DEGREES
T AXIS: 37 DEGREES
VENTRICULAR RATE: 92 BPM

## 2024-02-09 PROCEDURE — 93010 ELECTROCARDIOGRAM REPORT: CPT | Performed by: INTERNAL MEDICINE

## 2024-02-09 PROCEDURE — 93005 ELECTROCARDIOGRAM TRACING: CPT

## 2024-02-15 ENCOUNTER — TELEPHONE (OUTPATIENT)
Facility: LOCATION | Age: 39
End: 2024-02-15

## 2024-02-15 NOTE — TELEPHONE ENCOUNTER
Pt called to let us know he will be faxing or emailing Disab/FMLA forms soon for his sx 2/23/24. Forms dept fax and email were provided to patient. Pt stated he will call back to confirm forms were received.     Type of Leave: STD/fmla  Reason for Leave: sx  Start date of leave: 2/25/24-poss. 2 weeks  How much time needed?:   Forms Due Date:  Was Fee and Turnaround info Given?: yes

## 2024-02-23 ENCOUNTER — HOSPITAL ENCOUNTER (OUTPATIENT)
Facility: HOSPITAL | Age: 39
Setting detail: HOSPITAL OUTPATIENT SURGERY
Discharge: HOME | End: 2024-02-23
Attending: STUDENT IN AN ORGANIZED HEALTH CARE EDUCATION/TRAINING PROGRAM | Admitting: STUDENT IN AN ORGANIZED HEALTH CARE EDUCATION/TRAINING PROGRAM
Payer: COMMERCIAL

## 2024-02-23 ENCOUNTER — ANESTHESIA EVENT (OUTPATIENT)
Dept: SURGERY | Facility: HOSPITAL | Age: 39
End: 2024-02-23
Payer: COMMERCIAL

## 2024-02-23 ENCOUNTER — ANESTHESIA (OUTPATIENT)
Dept: SURGERY | Facility: HOSPITAL | Age: 39
End: 2024-02-23
Payer: COMMERCIAL

## 2024-02-23 VITALS
OXYGEN SATURATION: 98 % | HEART RATE: 66 BPM | RESPIRATION RATE: 20 BRPM | BODY MASS INDEX: 30.31 KG/M2 | TEMPERATURE: 97 F | HEIGHT: 68 IN | DIASTOLIC BLOOD PRESSURE: 88 MMHG | WEIGHT: 200 LBS | SYSTOLIC BLOOD PRESSURE: 129 MMHG

## 2024-02-23 DIAGNOSIS — K61.0 PERIANAL ABSCESS: Primary | ICD-10-CM

## 2024-02-23 PROBLEM — K60.30 ANAL FISTULA: Status: ACTIVE | Noted: 2024-02-23

## 2024-02-23 PROBLEM — K60.3 ANAL FISTULA: Status: ACTIVE | Noted: 2024-02-23

## 2024-02-23 PROCEDURE — 0DBQ8ZZ EXCISION OF ANUS, VIA NATURAL OR ARTIFICIAL OPENING ENDOSCOPIC: ICD-10-PCS | Performed by: STUDENT IN AN ORGANIZED HEALTH CARE EDUCATION/TRAINING PROGRAM

## 2024-02-23 PROCEDURE — 46280 REMOVE ANAL FIST COMPLEX: CPT | Performed by: STUDENT IN AN ORGANIZED HEALTH CARE EDUCATION/TRAINING PROGRAM

## 2024-02-23 RX ORDER — DEXAMETHASONE SODIUM PHOSPHATE 4 MG/ML
VIAL (ML) INJECTION AS NEEDED
Status: DISCONTINUED | OUTPATIENT
Start: 2024-02-23 | End: 2024-02-23 | Stop reason: SURG

## 2024-02-23 RX ORDER — HYDROCODONE BITARTRATE AND ACETAMINOPHEN 5; 325 MG/1; MG/1
1 TABLET ORAL ONCE AS NEEDED
Status: DISCONTINUED | OUTPATIENT
Start: 2024-02-23 | End: 2024-02-23

## 2024-02-23 RX ORDER — GLYCOPYRROLATE 0.2 MG/ML
INJECTION, SOLUTION INTRAMUSCULAR; INTRAVENOUS AS NEEDED
Status: DISCONTINUED | OUTPATIENT
Start: 2024-02-23 | End: 2024-02-23 | Stop reason: SURG

## 2024-02-23 RX ORDER — HYDROCODONE BITARTRATE AND ACETAMINOPHEN 5; 325 MG/1; MG/1
1 TABLET ORAL EVERY 6 HOURS PRN
Qty: 20 TABLET | Refills: 0 | Status: SHIPPED | OUTPATIENT
Start: 2024-02-23

## 2024-02-23 RX ORDER — ACETAMINOPHEN 500 MG
1000 TABLET ORAL ONCE
Status: DISCONTINUED | OUTPATIENT
Start: 2024-02-23 | End: 2024-02-23 | Stop reason: HOSPADM

## 2024-02-23 RX ORDER — HYDROMORPHONE HYDROCHLORIDE 1 MG/ML
0.6 INJECTION, SOLUTION INTRAMUSCULAR; INTRAVENOUS; SUBCUTANEOUS EVERY 5 MIN PRN
Status: DISCONTINUED | OUTPATIENT
Start: 2024-02-23 | End: 2024-02-23

## 2024-02-23 RX ORDER — METRONIDAZOLE 500 MG/100ML
500 INJECTION, SOLUTION INTRAVENOUS ONCE
Status: COMPLETED | OUTPATIENT
Start: 2024-02-23 | End: 2024-02-23

## 2024-02-23 RX ORDER — HYDROMORPHONE HYDROCHLORIDE 1 MG/ML
0.4 INJECTION, SOLUTION INTRAMUSCULAR; INTRAVENOUS; SUBCUTANEOUS EVERY 5 MIN PRN
Status: DISCONTINUED | OUTPATIENT
Start: 2024-02-23 | End: 2024-02-23

## 2024-02-23 RX ORDER — MIDAZOLAM HYDROCHLORIDE 1 MG/ML
INJECTION INTRAMUSCULAR; INTRAVENOUS AS NEEDED
Status: DISCONTINUED | OUTPATIENT
Start: 2024-02-23 | End: 2024-02-23 | Stop reason: SURG

## 2024-02-23 RX ORDER — ONDANSETRON 2 MG/ML
INJECTION INTRAMUSCULAR; INTRAVENOUS AS NEEDED
Status: DISCONTINUED | OUTPATIENT
Start: 2024-02-23 | End: 2024-02-23 | Stop reason: SURG

## 2024-02-23 RX ORDER — HYDROMORPHONE HYDROCHLORIDE 1 MG/ML
0.2 INJECTION, SOLUTION INTRAMUSCULAR; INTRAVENOUS; SUBCUTANEOUS EVERY 5 MIN PRN
Status: DISCONTINUED | OUTPATIENT
Start: 2024-02-23 | End: 2024-02-23

## 2024-02-23 RX ORDER — KETOROLAC TROMETHAMINE 30 MG/ML
INJECTION, SOLUTION INTRAMUSCULAR; INTRAVENOUS AS NEEDED
Status: DISCONTINUED | OUTPATIENT
Start: 2024-02-23 | End: 2024-02-23 | Stop reason: SURG

## 2024-02-23 RX ORDER — ROCURONIUM BROMIDE 10 MG/ML
INJECTION, SOLUTION INTRAVENOUS AS NEEDED
Status: DISCONTINUED | OUTPATIENT
Start: 2024-02-23 | End: 2024-02-23 | Stop reason: SURG

## 2024-02-23 RX ORDER — ONDANSETRON 2 MG/ML
4 INJECTION INTRAMUSCULAR; INTRAVENOUS EVERY 6 HOURS PRN
Status: DISCONTINUED | OUTPATIENT
Start: 2024-02-23 | End: 2024-02-23

## 2024-02-23 RX ORDER — LIDOCAINE HYDROCHLORIDE 10 MG/ML
INJECTION, SOLUTION EPIDURAL; INFILTRATION; INTRACAUDAL; PERINEURAL AS NEEDED
Status: DISCONTINUED | OUTPATIENT
Start: 2024-02-23 | End: 2024-02-23 | Stop reason: SURG

## 2024-02-23 RX ORDER — CEFAZOLIN SODIUM/WATER 2 G/20 ML
2 SYRINGE (ML) INTRAVENOUS ONCE
Status: COMPLETED | OUTPATIENT
Start: 2024-02-23 | End: 2024-02-23

## 2024-02-23 RX ORDER — NALOXONE HYDROCHLORIDE 0.4 MG/ML
0.08 INJECTION, SOLUTION INTRAMUSCULAR; INTRAVENOUS; SUBCUTANEOUS AS NEEDED
Status: DISCONTINUED | OUTPATIENT
Start: 2024-02-23 | End: 2024-02-23

## 2024-02-23 RX ORDER — BUPIVACAINE HYDROCHLORIDE AND EPINEPHRINE 5; 5 MG/ML; UG/ML
INJECTION, SOLUTION EPIDURAL; INTRACAUDAL; PERINEURAL AS NEEDED
Status: DISCONTINUED | OUTPATIENT
Start: 2024-02-23 | End: 2024-02-23 | Stop reason: HOSPADM

## 2024-02-23 RX ORDER — ACETAMINOPHEN 500 MG
1000 TABLET ORAL ONCE AS NEEDED
Status: DISCONTINUED | OUTPATIENT
Start: 2024-02-23 | End: 2024-02-23

## 2024-02-23 RX ORDER — NEOSTIGMINE METHYLSULFATE 1 MG/ML
INJECTION, SOLUTION INTRAVENOUS AS NEEDED
Status: DISCONTINUED | OUTPATIENT
Start: 2024-02-23 | End: 2024-02-23 | Stop reason: SURG

## 2024-02-23 RX ORDER — SCOLOPAMINE TRANSDERMAL SYSTEM 1 MG/1
1 PATCH, EXTENDED RELEASE TRANSDERMAL ONCE
Status: DISCONTINUED | OUTPATIENT
Start: 2024-02-23 | End: 2024-02-23 | Stop reason: HOSPADM

## 2024-02-23 RX ORDER — HYDROCODONE BITARTRATE AND ACETAMINOPHEN 5; 325 MG/1; MG/1
2 TABLET ORAL ONCE AS NEEDED
Status: DISCONTINUED | OUTPATIENT
Start: 2024-02-23 | End: 2024-02-23

## 2024-02-23 RX ORDER — EPHEDRINE SULFATE 50 MG/ML
INJECTION INTRAVENOUS AS NEEDED
Status: DISCONTINUED | OUTPATIENT
Start: 2024-02-23 | End: 2024-02-23 | Stop reason: SURG

## 2024-02-23 RX ORDER — SODIUM CHLORIDE, SODIUM LACTATE, POTASSIUM CHLORIDE, CALCIUM CHLORIDE 600; 310; 30; 20 MG/100ML; MG/100ML; MG/100ML; MG/100ML
INJECTION, SOLUTION INTRAVENOUS CONTINUOUS
Status: DISCONTINUED | OUTPATIENT
Start: 2024-02-23 | End: 2024-02-23

## 2024-02-23 RX ORDER — INSULIN ASPART 100 [IU]/ML
INJECTION, SOLUTION INTRAVENOUS; SUBCUTANEOUS ONCE
Status: DISCONTINUED | OUTPATIENT
Start: 2024-02-23 | End: 2024-02-23

## 2024-02-23 RX ORDER — PROCHLORPERAZINE EDISYLATE 5 MG/ML
5 INJECTION INTRAMUSCULAR; INTRAVENOUS EVERY 8 HOURS PRN
Status: DISCONTINUED | OUTPATIENT
Start: 2024-02-23 | End: 2024-02-23

## 2024-02-23 RX ADMIN — NEOSTIGMINE METHYLSULFATE 3 MG: 1 INJECTION, SOLUTION INTRAVENOUS at 13:28:00

## 2024-02-23 RX ADMIN — EPHEDRINE SULFATE 10 MG: 50 INJECTION INTRAVENOUS at 13:26:00

## 2024-02-23 RX ADMIN — GLYCOPYRROLATE 0.4 MG: 0.2 INJECTION, SOLUTION INTRAMUSCULAR; INTRAVENOUS at 13:28:00

## 2024-02-23 RX ADMIN — DEXAMETHASONE SODIUM PHOSPHATE 4 MG: 4 MG/ML VIAL (ML) INJECTION at 12:45:00

## 2024-02-23 RX ADMIN — ONDANSETRON 4 MG: 2 INJECTION INTRAMUSCULAR; INTRAVENOUS at 12:45:00

## 2024-02-23 RX ADMIN — MIDAZOLAM HYDROCHLORIDE 2 MG: 1 INJECTION INTRAMUSCULAR; INTRAVENOUS at 12:38:00

## 2024-02-23 RX ADMIN — SODIUM CHLORIDE, SODIUM LACTATE, POTASSIUM CHLORIDE, CALCIUM CHLORIDE: 600; 310; 30; 20 INJECTION, SOLUTION INTRAVENOUS at 13:34:00

## 2024-02-23 RX ADMIN — ROCURONIUM BROMIDE 25 MG: 10 INJECTION, SOLUTION INTRAVENOUS at 12:40:00

## 2024-02-23 RX ADMIN — SODIUM CHLORIDE, SODIUM LACTATE, POTASSIUM CHLORIDE, CALCIUM CHLORIDE: 600; 310; 30; 20 INJECTION, SOLUTION INTRAVENOUS at 12:36:00

## 2024-02-23 RX ADMIN — GLYCOPYRROLATE 0.4 MG: 0.2 INJECTION, SOLUTION INTRAMUSCULAR; INTRAVENOUS at 13:21:00

## 2024-02-23 RX ADMIN — CEFAZOLIN SODIUM/WATER 2 G: 2 G/20 ML SYRINGE (ML) INTRAVENOUS at 12:45:00

## 2024-02-23 RX ADMIN — KETOROLAC TROMETHAMINE 15 MG: 30 INJECTION, SOLUTION INTRAMUSCULAR; INTRAVENOUS at 13:28:00

## 2024-02-23 RX ADMIN — METRONIDAZOLE 500 MG: 500 INJECTION, SOLUTION INTRAVENOUS at 12:45:00

## 2024-02-23 RX ADMIN — LIDOCAINE HYDROCHLORIDE 50 MG: 10 INJECTION, SOLUTION EPIDURAL; INFILTRATION; INTRACAUDAL; PERINEURAL at 12:38:00

## 2024-02-23 NOTE — INTERVAL H&P NOTE
Pre-op Diagnosis: Anal fistula [K60.3]    The above referenced H&P was reviewed by Fco Schulte MD on 2/23/2024, the patient was examined and no significant changes have occurred in the patient's condition since the H&P was performed.  I discussed with the patient and/or legal representative the potential benefits, risks and side effects of this procedure; the likelihood of the patient achieving goals; and potential problems that might occur during recuperation.  I discussed reasonable alternatives to the procedure, including risks, benefits and side effects related to the alternatives and risks related to not receiving this procedure.  We will proceed with procedure as planned.

## 2024-02-23 NOTE — ANESTHESIA PROCEDURE NOTES
Airway  Date/Time: 2/23/2024 12:42 PM  Urgency: elective    Airway not difficult    General Information and Staff    Patient location during procedure: OR  Anesthesiologist: Rhett Mayen DO  Performed: anesthesiologist   Performed by: Rhett Mayen DO  Authorized by: Rhett Mayen DO      Indications and Patient Condition  Indications for airway management: anesthesia  Sedation level: deep  Preoxygenated: yes  Patient position: sniffing  Mask difficulty assessment: 1 - vent by mask    Final Airway Details  Final airway type: endotracheal airway      Successful airway: ETT  Cuffed: yes   Successful intubation technique: direct laryngoscopy  Endotracheal tube insertion site: oral  Blade: Nuno  Blade size: #3  ETT size (mm): 7.5    Cormack-Lehane Classification: grade IIA - partial view of glottis  Placement verified by: capnometry   Measured from: lips  ETT to lips (cm): 21  Number of attempts at approach: 1

## 2024-02-23 NOTE — ANESTHESIA POSTPROCEDURE EVALUATION
[facility]    Drew Hughes Patient Status:  Hospital Outpatient Surgery   Age/Gender 39 year old male MRN [id number]   Location [facility] POST ANESTHESIA CARE UNIT Attending Fco Schulte MD   Hosp Day # 0 PCP Mecca Parks DO       Anesthesia Post-op Note    ANAL EXAMINATION UNDER ANESTHESIA WITH FISTULOTOMY    Procedure Summary       Date: 02/23/24 Room / Location:  MAIN OR 04 / [facility] MAIN OR    Anesthesia Start: 1235 Anesthesia Stop: 1350    Procedure: ANAL EXAMINATION UNDER ANESTHESIA WITH FISTULOTOMY (Anus) Diagnosis:       Anal fistula      (Anal fistula [K60.3])    Surgeons: Fco Schulte MD Anesthesiologist: Rhett Mayen DO    Anesthesia Type: general ASA Status: 2            Anesthesia Type: general    Vitals Value Taken Time   /85 02/23/24 1405   Temp 97.4 °F (36.3 °C) 02/23/24 1350   Pulse 86 02/23/24 1405   Resp 16 02/23/24 1405   SpO2 98 % 02/23/24 1405   Vitals shown include unfiled device data.    Patient Location: PACU    Anesthesia Type: general    Airway Patency: patent and extubated    Postop Pain Control: adequate    Mental Status: mildly sedated but able to meaningfully participate in the post-anesthesia evaluation    Nausea/Vomiting: none    Cardiopulmonary/Hydration status: stable euvolemic    Complications: no apparent anesthesia related complications    Postop vital signs: stable    Dental Exam: Unchanged from Preop    Patient to be discharged from PACU when criteria met.

## 2024-02-23 NOTE — OPERATIVE REPORT
[facility]  Operative Note    Drew Hughes Location: OR   [facility] [id number] MRN [id number]    1/15/1985 Age 39 year old   Admission Date 2024 Operation Date 2024   Attending Physician Fco Schulte MD Operating Physician Fco Schulte MD   PCP Mecca Parks DO          Patient Name: Drew Hughes    Preoperative Diagnosis: Anal fistula [K60.3]    Postoperative Diagnosis: Anal fistula    Primary Surgeon: Fco Schulte MD    Assistant: None    Anesthesia: General    Procedures: Anal exam under anesthesia with anoscopy, primary fistulotomy    Implants: None    Specimen: None    Drains: None    Estimated Blood Loss: 5 cc    Complications: None immediate    Condition: Stable    Indications for Surgery:   This is a very nice 39-year-old gentleman referred to me from my partner, Dr. Puga, as a colorectal specialist in regards to a nonhealing wound after incision and drainage of a perianal abscess and concern for anal fistula.  Patient was seen in urgent care and the emergency room on 2023 in regards to anorectal pain.  He was diagnosed with a small abscess and was discharged home with a course of antibiotics.  He saw Dr. Puga on 2023 and Dr. Puga was performed a bedside incision and drainage at that time.  He has been seeing Dr. Puga about every 2 weeks since that time for ongoing care and wound checks.     Patient continues to have ongoing, intermittent anorectal pain.  He states at times the pain is nonexistent and other times it is severe as high as 10 out of 10 in intensity.  He continues to have drainage from the I&D wound that is typically thick yellow in color and he describes it as pus.  He also has bleeding from the wound at times.  He has noticed that he has passed flatus through the wound.  This has been his first lifetime perianal abscess.  No prior anorectal surgery.  He denies any abdominal pain, diarrhea or rectal bleeding.  No recent fevers.  No  incontinence.  No recent antibiotics.     On bedside exam, there is a thin radial wound in the left anterior anoderm beginning just outside the anal verge extending around 2-3 cm from the anal verge.  The wound base is filled with raised granulation tissue.  The area is tender to palpation and I am able to express purulent fluid from the center of the wound.  I attempted digital rectal examination that was limited due to patient discomfort.  Patient has very strong sphincter tone.  No obvious palpable internal openings.     Overall, patient's symptoms and exam findings are suspicious for an anal fistula     I discussed my impression with the patient that I am concerned about an anal fistula.  I recommend planning to go to the operating room for anal exam under anesthesia with anoscopy and peroxide fistulogram with possible fistulotomy, possible seton placement depending on intraoperative findings.  The details of this surgery were discussed including the expected recovery time, risks, benefits and alternatives.  Patient expressed understanding and was agreeable to schedule surgery with me.      He presents for elective surgery today.  Consent was signed.  All questions answered.    Surgical Findings:   Low transsphincteric radial left anterior anal fistula.  External opening in the left anterior anoderm 2 cm from the anal verge.  Internal opening distal to the dentate line.  1-2 mm of sphincter muscle accounting for no than 20% of the total sphincter bulk overlying the fistula tract.  Basal hypertonic sphincter tone.    Description of Procedure:   Patient was brought to the operating room on the transport cart.  Bilateral sequential compression devices were placed. Preoperative antibiotics were given.  Patient was induced under general endotracheal anesthesia.  Patient was then carefully flipped prone onto the OR table with all pressure points well-padded.  Patient was positioned in prone jackknife with the buttocks  retracted apart with silk tape.  The anus and perianal skin were prepped and draped in the usual sterile fashion.  A timeout was performed.     I began with external exam of the anus, digital rectal exam and anoscopy using a small [place]-Subramanian anoscope.  I encountered the exam findings as described above.  I was able to easily pass a fistula probe through the external opening radially towards a visible small internal opening where there was drainage of purulent fluid.  I cannulated the external opening with an angiocatheter and injected hydroperoxide through the tract.  This confirmed the presence of a left anterior internal opening distal to the dentate line.  I used the fistula probe to cannulate the fistula tract from the external opening to the internal opening.     The fistula probe was lifted up under tension. There was 1-2 mm of sphincter muscle accounting for no than 20% of the total sphincter bulk overlying the fistula tract.  The patient had hypertonic sphincter tone at baseline.  Therefore, I decided to perform a primary fistulotomy.  The tissue overlying the probe was divided with electrocautery.  Once again, there was fairly minimal sphincter muscle overlying the fistula tract.  The fistula tract was curetted with a lap pad. Hemostasis was achieved using electrocautery.  The fistula tract was marsupialized using interrupted 3-0 Vicryl sutures. 10 cc of 0.5% Marcaine with epinephrine was injected around the fistulotomy wound for local anesthesia.     The skin was cleaned and dried and a dressing of 4 x 4 gauze, ABD pad, paper tape and underwear. Patient was carefully flipped back supine onto the transport cart, awakened from anesthesia, extubated and transferred to the postanesthesia care unit in stable condition. All sponge, needle management counts were correct at the end of the case. I was present for the entire case.         Fco Schulte MD  2/23/2024  2:01 PM

## 2024-02-23 NOTE — ANESTHESIA PREPROCEDURE EVALUATION
PRE-OP EVALUATION    Patient Name: Drew Hughes    Admit Diagnosis: Anal fistula [K60.3]    Pre-op Diagnosis: Anal fistula [K60.3]    ANAL EXAMINATION UNDER ANESTHESIA, POSSIBLE FISTULOTOMY, POSSIBLE SETON PLACEMENT    Anesthesia Procedure: ANAL EXAMINATION UNDER ANESTHESIA, POSSIBLE FISTULOTOMY, POSSIBLE SETON PLACEMENT    Surgeon(s) and Role:     * Fco Schulte MD - Primary    Pre-op vitals reviewed.        Body mass index is 30.41 kg/m².    Current medications reviewed.  Hospital Medications:  No current facility-administered medications on file as of .       Outpatient Medications:     No medications prior to admission.       Allergies: Patient has no known allergies.      Anesthesia Evaluation    Patient summary reviewed.    Anesthetic Complications  (-) history of anesthetic complications         GI/Hepatic/Renal    Negative GI/hepatic/renal ROS.                             Cardiovascular        Exercise tolerance: good     MET: >4      (+) hypertension and well controlled                                    Endo/Other    Negative endo/other ROS.                              Pulmonary    Negative pulmonary ROS.                (+) sleep apnea and noncompliant      Neuro/Psych    Negative neuro/psych ROS.                                  History reviewed. No pertinent surgical history.  Social History     Socioeconomic History    Marital status:    Tobacco Use    Smoking status: Never     Passive exposure: Never    Smokeless tobacco: Never   Vaping Use    Vaping Use: Never used   Substance and Sexual Activity    Alcohol use: Yes     Alcohol/week: 5.0 standard drinks of alcohol     Types: 2 Glasses of wine, 2 Cans of beer, 1 Shots of liquor per week    Drug use: Never   Other Topics Concern    Caffeine Concern No    Exercise No    Seat Belt No    Special Diet No    Stress Concern No    Weight Concern No     History   Drug Use Unknown     Available pre-op labs reviewed.  Lab Results   Component  Value Date    WBC 4.8 01/04/2024    RBC 5.64 01/04/2024    HGB 15.0 01/04/2024    HCT 47.8 01/04/2024    MCV 84.8 01/04/2024    MCH 26.6 01/04/2024    MCHC 31.4 01/04/2024    RDW 13.7 01/04/2024    .0 01/04/2024     Lab Results   Component Value Date     01/04/2024    K 4.1 01/04/2024     01/04/2024    CO2 25.0 01/04/2024    BUN 8 (L) 01/04/2024    CREATSERUM 0.80 01/04/2024    GLU 87 01/04/2024    CA 8.9 01/04/2024            Airway      Mallampati: II  Mouth opening: >3 FB  TM distance: 4 - 6 cm  Neck ROM: full Cardiovascular    Cardiovascular exam normal.         Dental    Dentition appears grossly intact         Pulmonary    Pulmonary exam normal.                 Other findings              ASA: 2   Plan: general  NPO status verified and     Post-procedure pain management plan discussed with surgeon and patient.    Comment: PONV, dental/oral injury, corneal abrasion, postoperative pain, allergic reaction, death, aspiration    Plan/risks discussed with: patient  Use of blood product(s) discussed with: patient    Consented to blood products.          Present on Admission:  **None**

## 2024-02-23 NOTE — DISCHARGE INSTRUCTIONS
Anal Surgery  Post-Surgical Instructions     Fco Schulte MD         MEDICATIONS  You will be given a prescription for pain.  Take 1 tablet every 6 hours as needed.  Pain medications will ease your pain, but you should expect some incisional pain for about 7-10 days.  You may take acetaminophen (Tylenol) up to 650 mg every 6 hours as needed for mild-moderate pain. You may take ibuprofen (Motrin) up to 600 mg every 6 hours as needed for mild-moderate pain. Take narcotic pain medication as needed for severe pain per your prescription. Do not drive while taking narcotic pain medication. Many patients take a stool softener as narcotics are known to cause constipation. You should walk often, cough and take deep breaths.       DIET  You will begin a high fiber diet.  The easiest way to a high fiber diet is to take a fiber supplement.  An excellent supplement is plain, unflavored Metamucil.  You should take one tablespoon in 8 oz of water twice a day.  Ideally, you should take the supplement before breakfast and dinner.  You may experience some gas bloating for the first 2 weeks. This is normal and will go away as long as you keep taking the supplement.  Other supplements that can be taken include Per Yessica, Benefiber, Konsyl, or Citrucel. Continue to take the fiber supplement for 1 month.     In addition, it is a good idea go to the drugstore and a stool softener, such as docusate, and a gentle laxative such as MiraLAX.  Taking MiraLAX daily and stool softener 1-2 times a day may prevent you from getting overly constipated while on the narcotic drug.  Once you stop taking the prescription pain medication, you may stop taking the stool softener and laxative.        WOUND CARE   You will perform sitz baths two-three times a day for 3-4 weeks.  Sitz baths simply mean soaking your anus in a tub of warm-hot water for about 15 minutes.  Sitz baths will clean the anal wound as well as relax the anal sphincter muscles, which  will help minimize your pain.  Be careful around the anal wound, especially if you have stitches on the outside.  Gently pat your anus dry (never rub) or simply dry your anus with a blow-dryer set to cool/warm.   Do not soak your anus for more than 15 minutes.        ACTIVITY  After surgery, your driving reflexes will be slower, especially if you are taking pain medications.  Therefore, you are restricted from driving until after your follow-up visit and after you have stopped taking your prescription pain meds.  You may walk about the house, go shopping, or eat at a restaurant.  You may also climb stairs and excerise but no weight lifting.  You can sit on your wound, but keep in mind that the less you sit, the less pain you will have.     APPOINTMENT  Please call our office for an appointment in 2-4 weeks after surgery, unless otherwise instructed.  This will allow ample time for the swelling and soreness to resolve before your wound is examined.  There may be some bleeding from your wound.  This is normal.  If you begin bleeding heavily, have fevers, chills, or if you are concerned about your wound, please call us immediately at [phone].     Thank you for entrusting us with your care.         You received a drug called Toradol which is an Anti Inflammatory at:1:30 pm  If you are allowed to take Anti inflammatories:    Do not take any Anti Inflammatory like Motrin, Aleve or Ibuprophen until after:8:30 pm  Please report any suspected allergic reactions or bleeding issues to your doctor

## 2024-03-14 ENCOUNTER — OFFICE VISIT (OUTPATIENT)
Facility: LOCATION | Age: 39
End: 2024-03-14
Payer: COMMERCIAL

## 2024-03-14 VITALS — HEART RATE: 81 BPM | TEMPERATURE: 97 F

## 2024-03-14 DIAGNOSIS — Z09 POSTOP CHECK: Primary | ICD-10-CM

## 2024-03-14 PROCEDURE — 99024 POSTOP FOLLOW-UP VISIT: CPT | Performed by: PHYSICIAN ASSISTANT

## 2024-03-14 NOTE — PROGRESS NOTES
Post Operative Visit Note       Active Problems  1. Postop check         Chief Complaint   Chief Complaint   Patient presents with    Post-Op     PO - ANAL EXAMINATION UNDER ANESTHESIA WITH FISTULOTOMY 2/23/24, no symptoms.          History of Present Illness   39 year old male presents for postop check following anal exam under anesthesia with fistulotomy on 2/23/2024 by Dr. Schulte.  Patient states that he has been recovering well.  He endorses minimal pain, and is no longer requiring pain medicine.  He continues to do sitz bath's.  Tolerating a regular diet and having normal bowel movements.  States he did require MiraLAX for the first week, though no longer has been needing to take.  He covers the area with a dry gauze and reports minimal drainage.  Denies any purulence.  No fevers or chills.          Allergies  Drew has No Known Allergies.    Past Medical / Surgical / Social / Family History    The past medical and past surgical history have been reviewed by me today.     Past Medical History:   Diagnosis Date    High blood pressure      History reviewed. No pertinent surgical history.    The family history and social history have been reviewed by me today.    Family History   Problem Relation Age of Onset    Diabetes Mother     Infectious Disease Mother      Social History     Socioeconomic History    Marital status:    Tobacco Use    Smoking status: Never     Passive exposure: Never    Smokeless tobacco: Never   Vaping Use    Vaping Use: Never used   Substance and Sexual Activity    Alcohol use: Yes     Alcohol/week: 5.0 standard drinks of alcohol     Types: 2 Glasses of wine, 2 Cans of beer, 1 Shots of liquor per week    Drug use: Never   Other Topics Concern    Caffeine Concern No    Exercise No    Seat Belt No    Special Diet No    Stress Concern No    Weight Concern No        Current Outpatient Medications:     HYDROcodone-acetaminophen 5-325 MG Oral Tab, Take 1 tablet by mouth every 6 (six) hours  as needed for Pain., Disp: 20 tablet, Rfl: 0    Ascorbic Acid (VITAMIN C OR), Take by mouth., Disp: , Rfl:     ergocalciferol 1.25 MG (67743 UT) Oral Cap, Take 1 capsule (50,000 Units total) by mouth once a week., Disp: , Rfl:     telmisartan 40 MG Oral Tab, Take 1 tablet (40 mg total) by mouth daily., Disp: 90 tablet, Rfl: 3      Review of Systems  A 10 point Review of Systems has been completed by me today and is negative except as above in the HPI.    Physical Findings   Pulse 81   Temp 97.3 °F (36.3 °C) (Temporal)   Gen/psych: alert and oriented, cooperative, no apparent distress  Cardiovascular: regular rate  Respiratory: respirations unlabored, no wheeze  Abdominal: soft, non-tender, non-distended, no guarding/rebound  Rectal: Patient was examined in the prone jackknife position with chaperone present.  Fistulotomy site healing well without signs of infection.  Healthy granulation tissue present.  No erythema or purulence.  Minimal tenderness.         Assessment/Plan  1. Postop check        39 year old male presents for postop check following anal exam under anesthesia with fistulotomy on 2/23/2024 by Dr. Schulte.     The patient is doing well following anal exam under anesthesia with fistulotomy  The patient is to continue with diet as tolerated.  Continue sitz bath's  May incorporate fiber supplement.  Use stool softener as needed.  All questions and concerns were answered.  The patient should follow-up with Dr. Schulte in 1 month, or sooner as needed     No orders of the defined types were placed in this encounter.       Imaging & Referrals   None    Follow Up  Return if symptoms worsen or fail to improve.    Camilla Pickering PA-C  [facility]  3/14/2024  11:37 AM

## 2024-04-30 ENCOUNTER — NURSE ONLY (OUTPATIENT)
Facility: LOCATION | Age: 39
End: 2024-04-30
Payer: COMMERCIAL

## 2024-04-30 VITALS — HEART RATE: 77 BPM | TEMPERATURE: 97 F

## 2024-04-30 DIAGNOSIS — Z09 POSTOP CHECK: Primary | ICD-10-CM

## 2024-04-30 PROCEDURE — 99024 POSTOP FOLLOW-UP VISIT: CPT | Performed by: PHYSICIAN ASSISTANT

## 2024-04-30 NOTE — PROGRESS NOTES
Post Operative Visit Note       Active Problems  1. Postop check         Chief Complaint   Chief Complaint   Patient presents with    Post-Op     PO 2/23 ANAL EXAMINATION UNDER ANESTHESIA WITH FISTULOTOMY W/MADHURI           History of Present Illness   39 year old male presents for postop visit following anal exam under anesthesia with fistulotomy on 2/23/2024 with Dr. Schulte.  Patient was last seen on 3/14.  He states that he has recovered well.  He believes the wound has completely closed.  He is no longer requiring sitz bath's or any pain medicine.  He denies any rectal or perirectal pain.  No swelling, redness, drainage.  He is having normal bowel movements.  Denies any issues today.          Allergies  Drew has No Known Allergies.    Past Medical / Surgical / Social / Family History    The past medical and past surgical history have been reviewed by me today.     Past Medical History:    High blood pressure     History reviewed. No pertinent surgical history.    The family history and social history have been reviewed by me today.    Family History   Problem Relation Age of Onset    Diabetes Mother     Infectious Disease Mother      Social History     Socioeconomic History    Marital status:    Tobacco Use    Smoking status: Never     Passive exposure: Never    Smokeless tobacco: Never   Vaping Use    Vaping status: Never Used   Substance and Sexual Activity    Alcohol use: Yes     Alcohol/week: 5.0 standard drinks of alcohol     Types: 2 Glasses of wine, 2 Cans of beer, 1 Shots of liquor per week    Drug use: Never   Other Topics Concern    Caffeine Concern No    Exercise No    Seat Belt No    Special Diet No    Stress Concern No    Weight Concern No        Current Outpatient Medications:     HYDROcodone-acetaminophen 5-325 MG Oral Tab, Take 1 tablet by mouth every 6 (six) hours as needed for Pain., Disp: 20 tablet, Rfl: 0    Ascorbic Acid (VITAMIN C OR), Take by mouth., Disp: , Rfl:     ergocalciferol  1.25 MG (70809 UT) Oral Cap, Take 1 capsule (50,000 Units total) by mouth once a week., Disp: , Rfl:     telmisartan 40 MG Oral Tab, Take 1 tablet (40 mg total) by mouth daily., Disp: 90 tablet, Rfl: 3      Review of Systems  A 10 point Review of Systems has been completed by me today and is negative except as above in the HPI.    Physical Findings   Pulse 77   Temp 96.8 °F (36 °C) (Temporal)   Gen/psych: alert and oriented, cooperative, no apparent distress  Cardiovascular: regular rate  Respiratory: respirations unlabored, no wheeze  Abdominal: soft, non-tender, non-distended, no guarding/rebound  Rectal: The patient was examined in the prone jackknife position with a chaperone present.  Fistulotomy site is well-healed without any signs of infection, and no overlying tenderness.         Assessment/Plan  1. Postop check        39 year old male presents for postop visit following anal exam under anesthesia with fistulotomy on 2/23/2024 with Dr. Schulte.    The patient has recovered well from fistulotomy  He may continue with diet as tolerated  No additional wound care necessary  No ongoing restrictions.  We discussed that he should continue with fiber supplement, and use stool softener as needed.  All questions and concerns were answered  Follow-up with Dr. Schulte as needed        No orders of the defined types were placed in this encounter.       Imaging & Referrals   None    Follow Up  Return if symptoms worsen or fail to improve.    Camilla Pickering PA-C  [facility]  4/30/2024  9:14 AM

## 2024-10-23 DIAGNOSIS — I10 PRIMARY HYPERTENSION: ICD-10-CM

## 2024-10-24 RX ORDER — TELMISARTAN 40 MG/1
40 TABLET ORAL DAILY
Qty: 90 TABLET | Refills: 3 | Status: SHIPPED | OUTPATIENT
Start: 2024-10-24

## 2025-01-26 DIAGNOSIS — I10 PRIMARY HYPERTENSION: ICD-10-CM

## 2025-01-27 NOTE — TELEPHONE ENCOUNTER
A refill request was received for:  Requested Prescriptions     Pending Prescriptions Disp Refills    telmisartan 40 MG Oral Tab 90 tablet 3     Sig: Take 1 tablet (40 mg total) by mouth daily.       Last refill date:10/2024     Appt 1/30  Last office visit:12/2023    Follow up due:  Future Appointments   Date Time Provider Department Center   1/30/2025  5:00 PM Mecca Parks,  [facility] [address]

## 2025-01-28 RX ORDER — TELMISARTAN 40 MG/1
40 TABLET ORAL DAILY
Qty: 90 TABLET | Refills: 0 | Status: SHIPPED | OUTPATIENT
Start: 2025-01-28 | End: 2025-03-18

## 2025-02-06 ENCOUNTER — TELEPHONE (OUTPATIENT)
Dept: FAMILY MEDICINE CLINIC | Facility: CLINIC | Age: 40
End: 2025-02-06

## 2025-02-06 ENCOUNTER — OFFICE VISIT (OUTPATIENT)
Facility: LOCATION | Age: 40
End: 2025-02-06
Payer: COMMERCIAL

## 2025-02-06 ENCOUNTER — OFFICE VISIT (OUTPATIENT)
Dept: FAMILY MEDICINE CLINIC | Facility: CLINIC | Age: 40
End: 2025-02-06
Payer: COMMERCIAL

## 2025-02-06 VITALS
HEART RATE: 85 BPM | RESPIRATION RATE: 18 BRPM | DIASTOLIC BLOOD PRESSURE: 95 MMHG | OXYGEN SATURATION: 97 % | SYSTOLIC BLOOD PRESSURE: 147 MMHG | TEMPERATURE: 99 F

## 2025-02-06 VITALS
OXYGEN SATURATION: 99 % | DIASTOLIC BLOOD PRESSURE: 86 MMHG | WEIGHT: 202 LBS | BODY MASS INDEX: 30.62 KG/M2 | HEIGHT: 68 IN | RESPIRATION RATE: 18 BRPM | HEART RATE: 78 BPM | SYSTOLIC BLOOD PRESSURE: 120 MMHG

## 2025-02-06 DIAGNOSIS — K60.2 ANAL FISSURE: ICD-10-CM

## 2025-02-06 DIAGNOSIS — Z00.00 ANNUAL PHYSICAL EXAM: Primary | ICD-10-CM

## 2025-02-06 DIAGNOSIS — E55.9 VITAMIN D DEFICIENCY: ICD-10-CM

## 2025-02-06 DIAGNOSIS — F52.4 PREMATURE EJACULATION: ICD-10-CM

## 2025-02-06 DIAGNOSIS — K62.89 RECTAL PAIN: Primary | ICD-10-CM

## 2025-02-06 DIAGNOSIS — G47.33 OSA (OBSTRUCTIVE SLEEP APNEA): ICD-10-CM

## 2025-02-06 PROCEDURE — 99395 PREV VISIT EST AGE 18-39: CPT | Performed by: FAMILY MEDICINE

## 2025-02-06 PROCEDURE — 3074F SYST BP LT 130 MM HG: CPT | Performed by: FAMILY MEDICINE

## 2025-02-06 PROCEDURE — 3077F SYST BP >= 140 MM HG: CPT | Performed by: STUDENT IN AN ORGANIZED HEALTH CARE EDUCATION/TRAINING PROGRAM

## 2025-02-06 PROCEDURE — 3079F DIAST BP 80-89 MM HG: CPT | Performed by: FAMILY MEDICINE

## 2025-02-06 PROCEDURE — 99213 OFFICE O/P EST LOW 20 MIN: CPT | Performed by: STUDENT IN AN ORGANIZED HEALTH CARE EDUCATION/TRAINING PROGRAM

## 2025-02-06 PROCEDURE — 3008F BODY MASS INDEX DOCD: CPT | Performed by: FAMILY MEDICINE

## 2025-02-06 PROCEDURE — 3080F DIAST BP >= 90 MM HG: CPT | Performed by: STUDENT IN AN ORGANIZED HEALTH CARE EDUCATION/TRAINING PROGRAM

## 2025-02-06 RX ORDER — ESCITALOPRAM OXALATE 10 MG/1
TABLET ORAL
Qty: 30 TABLET | Refills: 5 | Status: SHIPPED | OUTPATIENT
Start: 2025-02-06

## 2025-02-06 RX ORDER — LEVOCETIRIZINE DIHYDROCHLORIDE 5 MG/1
1 TABLET, FILM COATED ORAL DAILY
COMMUNITY

## 2025-02-06 RX ORDER — ESCITALOPRAM OXALATE 10 MG/1
TABLET ORAL
Qty: 30 TABLET | Refills: 5 | Status: SHIPPED | OUTPATIENT
Start: 2025-02-06 | End: 2025-02-06

## 2025-02-06 NOTE — TELEPHONE ENCOUNTER
Not a lot of notice with this request.  Pt can have labs ordered at his vs, lab results would not be back in time for appt.  to order at appt.

## 2025-02-06 NOTE — TELEPHONE ENCOUNTER
Patient called requesting labs be ordered for his physical which is today at 6:30 pm. please advise.

## 2025-02-06 NOTE — PROGRESS NOTES
Follow Up Visit Note       Active Problems      1. Rectal pain    2. Anal fissure          Chief Complaint   Chief Complaint   Patient presents with    \[facility] [place] [facility]\"" Care     EP- ANAL EXAMINATION UNDER ANESTHESIA WITH FISTULOTOMY on 2/23/24- reports rectal pain, states bleeding at times with BMs        History of Present Illness  This is a very nice 40-year-old gentleman with history of asymptomatic left anterior transsphincteric anal fistula status post primary fistulotomy on 2/23/2024.  Patient returns to clinic today with concern for development of rectal pain with bowel movements.    Patient began having pain with bowel movements over the last 2 weeks.  This is associated with spotting.  He denies any prolapse, seepage, itching or incontinence.  He denies feeling a lump near his anus or any drainage.  He has rare bouts of constipation and is currently not taking any fiber supplements, stool softeners or laxatives.      Allergies  Drew is allergic to hydrochlorothiazide.    Past Medical / Surgical / Social / Family History    The past medical and past surgical history have been reviewed by me today.    Past Medical History:    High blood pressure     History reviewed. No pertinent surgical history.    The family history and social history have been reviewed by me today.    Family History   Problem Relation Age of Onset    Diabetes Mother     Infectious Disease Mother      Social History     Socioeconomic History    Marital status:    Tobacco Use    Smoking status: Never     Passive exposure: Never    Smokeless tobacco: Never   Vaping Use    Vaping status: Never Used   Substance and Sexual Activity    Alcohol use: Yes     Alcohol/week: 5.0 standard drinks of alcohol     Types: 2 Glasses of wine, 2 Cans of beer, 1 Shots of liquor per week    Drug use: Never   Other Topics Concern    Caffeine Concern No    Exercise No    Seat Belt No    Special Diet No    Stress Concern No    Weight Concern No        Current  Outpatient Medications:     telmisartan 40 MG Oral Tab, Take 1 tablet (40 mg total) by mouth daily., Disp: 90 tablet, Rfl: 0    Ascorbic Acid (VITAMIN C OR), Take by mouth., Disp: , Rfl:     ergocalciferol 1.25 MG (14332 UT) Oral Cap, Take 1 capsule (50,000 Units total) by mouth once a week., Disp: , Rfl:      Review of Systems  A 10 point review of systems was performed and negative unless otherwise documented per HPI.     Physical Findings   BP (!) 147/95   Pulse 85   Temp 98.6 °F (37 °C) (Temporal)   Resp 18   SpO2 97%   Physical Exam  Vitals and nursing note reviewed. Exam conducted with a chaperone present.   Constitutional:       General: He is not in acute distress.  HENT:      Head: Normocephalic and atraumatic.      Mouth/Throat:      Mouth: Mucous membranes are moist.   Cardiovascular:      Rate and Rhythm: Normal rate and regular rhythm.   Pulmonary:      Effort: Pulmonary effort is normal.   Abdominal:      General: There is no distension.      Palpations: Abdomen is soft.      Tenderness: There is no abdominal tenderness.   Genitourinary:     Comments: Patient examined in the prone jackknife position with medical assistant chaperone present.  External exam of the anus is normal aside from a well-healed left anterior fistulotomy scar.  There is no tenderness or drainage from this area.  Gentle digital rectal exam attempted and showed hypertonic sphincter tone with tenderness along the anterior midline anal canal.  No obvious fissure visualized.  Musculoskeletal:         General: No deformity.   Skin:     General: Skin is warm and dry.   Neurological:      General: No focal deficit present.      Mental Status: He is alert.   Psychiatric:         Mood and Affect: Mood normal.          Assessment   1. Rectal pain    2. Anal fissure      This is a very nice 40-year-old gentleman with history of asymptomatic left anterior transsphincteric anal fistula status post primary fistulotomy on 2/23/2024.  Patient  returns to clinic today with concern for development of rectal pain with bowel movements.    Patient began having pain with bowel movements over the last 2 weeks.  This is associated with spotting.  He denies any prolapse, seepage, itching or incontinence.  He denies feeling a lump near his anus or any drainage.  He has rare bouts of constipation and is currently not taking any fiber supplements, stool softeners or laxatives.    External exam of the anus is normal aside from a well-healed left anterior fistulotomy scar.  There is no tenderness or drainage from this area.  Gentle digital rectal exam attempted and showed hypertonic sphincter tone with tenderness along the anterior midline anal canal.  No obvious fissure visualized.    Plan   I suspect the patient has developed an anal fissure which would explain his ongoing pain with bowel movements and spotting.  There is no evidence suggestive of a recurrent perianal abscess or fistula.    I recommend nonoperative management of the suspected fissure with bowel regimen and topical muscle relaxant medication.  Patient has taken MiraLAX in the past with relief of constipation.  I advised patient takes MiraLAX daily.  I also prescribed him nifedipine/lidocaine ointment through a compound pharmacy to be used at least twice a day, ideally 3 times a day.    I would like see the patient back in around 6 weeks for ongoing follow-up.  If he continues to have rectal pain despite the above measures, next step would be an anal exam under anesthesia with anoscopy to confirm diagnosis and consider further surgical treatment for the suspected fissure.  Patient expressed understanding and was agreeable to plan.     No orders of the defined types were placed in this encounter.      Imaging & Referrals   None    Follow Up  No follow-ups on file.    Fco Schulte MD

## 2025-02-12 NOTE — H&P
The 21st Century Cures Act makes medical notes like these available to patients in the interest of transparency. Please be advised this is a medical document. Medical documents are intended to carry relevant information, facts as evident, and the clinical opinion of the practitioner. The medical note is intended as peer to peer communication and may appear blunt or direct. It is written in medical language and may contain abbreviations or verbiage that are unfamiliar.       Drew Hughes is a 40 year old male who presents for a complete physical exam.   HPI:   Pt complains of     + vit D def.  Not taking vit D.  No fractures.  Mild fatigue.     + snoring.  + unrefreshing sleep.  + daytime somnolence.  + fatigue.  + apnea.    4. Premature ejaculation.  Wants help with this.  Chronic issue.       Current Outpatient Medications   Medication Sig Dispense Refill    levocetirizine 5 MG Oral Tab Take 1 tablet (5 mg total) by mouth daily.      escitalopram 10 MG Oral Tab 1/2 tab daily x 6 days then 1 tab daily 30 tablet 5    telmisartan 40 MG Oral Tab Take 1 tablet (40 mg total) by mouth daily. 90 tablet 0      Past Medical History:    High blood pressure      History reviewed. No pertinent surgical history.   Family History   Problem Relation Age of Onset    Diabetes Mother     Infectious Disease Mother       Social History:  Social History     Socioeconomic History    Marital status:    Tobacco Use    Smoking status: Never     Passive exposure: Never    Smokeless tobacco: Never   Vaping Use    Vaping status: Never Used   Substance and Sexual Activity    Alcohol use: Yes     Alcohol/week: 5.0 standard drinks of alcohol     Types: 2 Glasses of wine, 2 Cans of beer, 1 Shots of liquor per week    Drug use: Never   Other Topics Concern    Caffeine Concern No    Exercise No    Seat Belt No    Special Diet No    Stress Concern No    Weight Concern No         REVIEW OF SYSTEMS:   GENERAL: feels well otherwise  SKIN:  denies any unusual skin lesions  EYES:denies blurred vision or double vision  HEENT: denies nasal congestion, sinus pain or ST, denies changes in hearing or vision  LUNGS: denies shortness of breath with exertion or frequent cough  CV: denies chest pain, pressure or palpitations  GI: denies abdominal pain,denies heartburn, denies chronic diarrhea or constipation  : denies nocturia or changes in stream, denies erectile dysfunction  MS: denies back pain, arthralgias or myalgias  NEURO: denies headaches or dizziness  PSYCH: denies depression or anxiety  HEMATOLOGIC: denies hx of anemia, easy bruising or bleeding  ENDOCRINE:denies frequent thirst or urination, denies unintentional weight gain/loss  ALL/ASTHMA: denies hx of allergy or asthma    EXAM:   /86   Pulse 78   Resp 18   Ht 5' 8\" (1.727 m)   Wt 202 lb (91.6 kg)   SpO2 99%   BMI 30.71 kg/m²   Body mass index is 30.71 kg/m².     GENERAL: well developed, well nourished,in no apparent distress  SKIN: no rashes,no suspicious lesions  HEENT: atraumatic, normocephalic, PERRLA, conjunctiva clear, TMs clear, posterior pharynx clear, no congestion  NECK: supple,no adenopathy,no thyromegaly  LUNGS: clear to auscultation, easy breathing  CV: S1S2, RRR without murmur  GI: good BS's;no masses, HSM or tenderness  : two descended testes,no scrotal tenderness or mass, normal penis no lesions, no hernia  MS: Sri, no bony deformities, gait normal  EXT: no cyanosis, clubbing or edema  NEURO: Oriented times three, strength 5/5 x 4 ext, LE DTRs 2+  PSYCH: mood and affect appropriate    ASSESSMENT AND PLAN:   Drew Hughes is a 40 year old male who presents for a complete physical exam. Recommended heart healthy diet, routine exercise, and annual flu vaccines.  Routine testicular exams reinforced. The patient indicates understanding of these issues and agrees to the plan.    Follow up in 1 year.    1. Annual physical exam  - Lipid Panel; Future  - CBC With  Differential With Platelet; Future  - Comp Metabolic Panel (14); Future  - TSH W Reflex To Free T4; Future  - Vitamin D; Future    2. Vitamin D deficiency  - Vitamin D; Future    3. VERNON (obstructive sleep apnea)  - Pulmonary Referral - [facility]    4. Premature ejaculation  - escitalopram 10 MG Oral Tab; 1/2 tab daily x 6 days then 1 tab daily  Dispense: 30 tablet; Refill: 5    Orders Placed This Encounter   Procedures    Lipid Panel     Standing Status:   Future     Standing Expiration Date:   2/6/2026    CBC With Differential With Platelet     Standing Status:   Future     Standing Expiration Date:   2/6/2026    Comp Metabolic Panel (14)     Standing Status:   Future     Standing Expiration Date:   2/6/2026    TSH W Reflex To Free T4     Standing Status:   Future     Standing Expiration Date:   2/6/2026    Vitamin D     Standing Status:   Future     Standing Expiration Date:   2/6/2026     Order Specific Question:   Please pick the scenario that best fits the purpose for ordering this test     Answer:   General Screening/Vit D deficiency (25-Hydroxy)     Order Specific Question:   Release to patient     Answer:   Immediate

## 2025-03-15 ENCOUNTER — LAB ENCOUNTER (OUTPATIENT)
Dept: LAB | Age: 40
End: 2025-03-15
Attending: FAMILY MEDICINE
Payer: COMMERCIAL

## 2025-03-15 DIAGNOSIS — Z00.00 ANNUAL PHYSICAL EXAM: ICD-10-CM

## 2025-03-15 DIAGNOSIS — E55.9 VITAMIN D DEFICIENCY: ICD-10-CM

## 2025-03-15 LAB
ALBUMIN SERPL-MCNC: 5 G/DL (ref 3.2–4.8)
ALBUMIN/GLOB SERPL: 1.9 {RATIO} (ref 1–2)
ALP LIVER SERPL-CCNC: 91 U/L
ALT SERPL-CCNC: 46 U/L
ANION GAP SERPL CALC-SCNC: 11 MMOL/L (ref 0–18)
AST SERPL-CCNC: 34 U/L (ref ?–34)
BASOPHILS # BLD AUTO: 0.04 X10(3) UL (ref 0–0.2)
BASOPHILS NFR BLD AUTO: 0.8 %
BILIRUB SERPL-MCNC: 0.5 MG/DL (ref 0.3–1.2)
BUN BLD-MCNC: 8 MG/DL (ref 9–23)
CALCIUM BLD-MCNC: 9.2 MG/DL (ref 8.7–10.6)
CHLORIDE SERPL-SCNC: 106 MMOL/L (ref 98–112)
CHOLEST SERPL-MCNC: 177 MG/DL (ref ?–200)
CO2 SERPL-SCNC: 25 MMOL/L (ref 21–32)
CREAT BLD-MCNC: 0.94 MG/DL
EGFRCR SERPLBLD CKD-EPI 2021: 106 ML/MIN/1.73M2 (ref 60–?)
EOSINOPHIL # BLD AUTO: 0.21 X10(3) UL (ref 0–0.7)
EOSINOPHIL NFR BLD AUTO: 4.4 %
ERYTHROCYTE [DISTWIDTH] IN BLOOD BY AUTOMATED COUNT: 13.1 %
FASTING PATIENT LIPID ANSWER: YES
FASTING STATUS PATIENT QL REPORTED: YES
GLOBULIN PLAS-MCNC: 2.7 G/DL (ref 2–3.5)
GLUCOSE BLD-MCNC: 88 MG/DL (ref 70–99)
HCT VFR BLD AUTO: 48.1 %
HDLC SERPL-MCNC: 44 MG/DL (ref 40–59)
HGB BLD-MCNC: 15.9 G/DL
IMM GRANULOCYTES # BLD AUTO: 0.04 X10(3) UL (ref 0–1)
IMM GRANULOCYTES NFR BLD: 0.8 %
LDLC SERPL CALC-MCNC: 85 MG/DL (ref ?–100)
LYMPHOCYTES # BLD AUTO: 1.8 X10(3) UL (ref 1–4)
LYMPHOCYTES NFR BLD AUTO: 37.9 %
MCH RBC QN AUTO: 27.8 PG (ref 26–34)
MCHC RBC AUTO-ENTMCNC: 33.1 G/DL (ref 31–37)
MCV RBC AUTO: 84.2 FL
MONOCYTES # BLD AUTO: 0.57 X10(3) UL (ref 0.1–1)
MONOCYTES NFR BLD AUTO: 12 %
NEUTROPHILS # BLD AUTO: 2.09 X10 (3) UL (ref 1.5–7.7)
NEUTROPHILS # BLD AUTO: 2.09 X10(3) UL (ref 1.5–7.7)
NEUTROPHILS NFR BLD AUTO: 44.1 %
NONHDLC SERPL-MCNC: 133 MG/DL (ref ?–130)
OSMOLALITY SERPL CALC.SUM OF ELEC: 292 MOSM/KG (ref 275–295)
PLATELET # BLD AUTO: 236 10(3)UL (ref 150–450)
POTASSIUM SERPL-SCNC: 3.8 MMOL/L (ref 3.5–5.1)
PROT SERPL-MCNC: 7.7 G/DL (ref 5.7–8.2)
RBC # BLD AUTO: 5.71 X10(6)UL
SODIUM SERPL-SCNC: 142 MMOL/L (ref 136–145)
TRIGL SERPL-MCNC: 290 MG/DL (ref 30–149)
TSI SER-ACNC: 2.28 UIU/ML (ref 0.55–4.78)
VIT D+METAB SERPL-MCNC: 15.1 NG/ML (ref 30–100)
VLDLC SERPL CALC-MCNC: 47 MG/DL (ref 0–30)
WBC # BLD AUTO: 4.8 X10(3) UL (ref 4–11)

## 2025-03-15 PROCEDURE — 82306 VITAMIN D 25 HYDROXY: CPT | Performed by: FAMILY MEDICINE

## 2025-03-15 PROCEDURE — 80061 LIPID PANEL: CPT | Performed by: FAMILY MEDICINE

## 2025-03-15 PROCEDURE — 80050 GENERAL HEALTH PANEL: CPT | Performed by: FAMILY MEDICINE

## 2025-03-18 DIAGNOSIS — I10 PRIMARY HYPERTENSION: ICD-10-CM

## 2025-03-18 RX ORDER — TELMISARTAN 40 MG/1
40 TABLET ORAL DAILY
Qty: 90 TABLET | Refills: 0 | Status: SHIPPED | OUTPATIENT
Start: 2025-03-18

## 2025-07-15 DIAGNOSIS — I10 PRIMARY HYPERTENSION: ICD-10-CM

## 2025-07-16 RX ORDER — TELMISARTAN 40 MG/1
40 TABLET ORAL DAILY
Qty: 90 TABLET | Refills: 1 | Status: SHIPPED | OUTPATIENT
Start: 2025-07-16

## (undated) DEVICE — SOLUTION IRRIG 1000ML 0.9% NACL USP BTL

## (undated) DEVICE — UNDERPANTS INCONT 2XL KNIT MAT

## (undated) DEVICE — SUTURE PERMAHAND SZ 0 L18IN NONABSORBABLE BLK

## (undated) DEVICE — SUTURE COAT VCRL 2-0 27IN ABSRB VLT 26MM SH

## (undated) DEVICE — PACK CDS RECTAL

## (undated) DEVICE — PAD ABD W7.5XL8IN GEN USE NONADHESIVE EXTRA

## (undated) DEVICE — SPONGE GZ W4XL4IN 100% COT 12 PLY TYP VII WVN

## (undated) DEVICE — GLOVE SURGICAL 7.5 SENSICARE P

## (undated) DEVICE — GLOVE SUR 7 SENSICARE PI PIP CRM PWD F

## (undated) DEVICE — PENCIL TELESCOPE MEGADYNE SE

## (undated) DEVICE — VESSEL LOOP MAXI BLUE

## (undated) DEVICE — SOLUTION PREP 4OZ 3% H2O2 1ST AID ANTISEP

## (undated) DEVICE — SPONGE GZ W4XL8IN COT 12 PLY WVN

## (undated) DEVICE — SLEEVE COMPR M KNEE LEN SGL USE KENDALL SCD

## (undated) NOTE — LETTER
Date & Time: 11/20/2023, 7:26 PM  Patient: Antoni Recinos  Encounter Provider(s):    Karissa Johnson MD       To Whom It May Concern:    Antoni Recinos was seen and treated in our department on 11/20/2023. He should not return to work until November 27, 2023 .     If you have any questions or concerns, please do not hesitate to call.        _____________________________  Physician/APC Signature

## (undated) NOTE — LETTER
24    Patient: Drew Hughes  : 1/15/1985 Visit date: 2024    Dear  Mecca Parks DO    Thank you for referring Drew Hughes to my practice.  Please find my assessment and plan below.    Assessment   1. Perianal abscess        Plan     The patient has persistent drainage and discomfort in the area of perianal abscess drainage.  Although I offered evaluation under anesthesia and possible surgical intervention, the patient wishes a second opinion  My office facilitated a appointment with Dr. Schulte for second opinion as a colorectal surgeon.  I provided samples of RectiCare for the patient in hopes of providing topical analgesia to the area of healing.  Patient will return to my attention if he wishes further evaluation management by me, otherwise, he is welcome to transition his care to Dr. Schulte.    The patient was provided ample opportunity to ask questions.  All of the patient's questions were answered in detail.  The patient voiced understanding of the care plan.       Sincerely,       Luis Felipe Puga MD   CC:   No Recipients

## (undated) NOTE — LETTER
24    Patient: Drew Hughes  : 1/15/1985 Visit date: 2024    Dear  Mecca Parks DO    Thank you for referring Drew Hughes to my practice.  Please find my assessment and plan below.    Assessment   1. Perianal abscess        Plan     The patient incision and drainage site continues to heal well.  No active infection or abscess now.  Two discrete areas of hypertrophic granulation tissue were chemically cauterized with silver nitrate in the office today.  Anticipated wound healing trajectory and wound management recommendations discussed.  Follow-up in 2 weeks for continued wound surveillance.  The patient was provided ample opportunity to ask questions.  All of the patient's questions were answered in detail.  The patient voiced understanding of the care plan.       Sincerely,       Luis Felipe Puga MD   CC:   No Recipients

## (undated) NOTE — LETTER
24    Patient: Drew Hughes  : 1/15/1985 Visit date: 2024    Dear  Mecca Parks DO    Thank you for referring Drew Hughes to my practice.  Please find my assessment and plan below.     Assessment   1. Anal fistula      This is a very nice 39-year-old gentleman referred to me from my partner, Dr. Puga, as a colorectal specialist in regards to a nonhealing wound after incision and drainage of a perianal abscess and concern for anal fistula.  Patient was seen in urgent care and the emergency room on 2023 in regards to anorectal pain.  He was diagnosed with a small abscess and was discharged home with a course of antibiotics.  He saw Dr. Puga on 2023 and Dr. Puga was performed a bedside incision and drainage at that time.  He has been seeing Dr. Puga about every 2 weeks since that time for ongoing care and wound checks.    Patient continues to have ongoing, intermittent anorectal pain.  He states at times the pain is nonexistent and other times it is severe as high as 10 out of 10 in intensity.  He continues to have drainage from the I&D wound that is typically thick yellow in color and he describes it as pus.  He also has bleeding from the wound at times.  He has noticed that he has passed flatus through the wound.  This has been his first lifetime perianal abscess.  No prior anorectal surgery.  He denies any abdominal pain, diarrhea or rectal bleeding.  No recent fevers.  No incontinence.  No recent antibiotics.    On bedside exam, there is a thin radial wound in the left anterior anoderm beginning just outside the anal verge extending around 2-3 cm from the anal verge.  The wound base is filled with raised granulation tissue.  The area is tender to palpation and I am able to express purulent fluid from the center of the wound.  I attempted digital rectal examination that was limited due to patient discomfort.  Patient has very strong sphincter tone.  No obvious  palpable internal openings.    Overall, patient's symptoms and exam findings are suspicious for an anal fistula    Plan  I discussed my impression with the patient that I am concerned about an anal fistula.  I recommend planning to go to the operating room for anal exam under anesthesia with anoscopy and peroxide fistulogram with possible fistulotomy, possible seton placement depending on intraoperative findings.  The details of this surgery were discussed including the expected recovery time, risks, benefits and alternatives.  Patient expressed understanding and was agreeable to schedule surgery with me.  Surgery has been scheduled for 1/23/2024.  I did  patient that he may need more surgery down the road if there is evidence of a high transsphincteric anal fistula.      Sincerely,       Fco Schulte MD   CC:   No Recipients

## (undated) NOTE — LETTER
23    Patient: Michael Pepe  : 1/15/1985 Visit date: 2023    Dear  Karina Weir DO    Thank you for referring Michael Pepe to my practice. Please find my assessment and plan below. Assessment   1. Perianal abscess        Plan     The patient continues to improve following incision and drainage of perianal abscess. Anticipated wound healing continues. Mild, expected drainage persists from area of minor hypertrophic granulation tissue  No active infection, undrained fluid or abscess now. Local care discussed including analgesic options. Follow-up in 2 weeks for continued care. The patient was provided ample opportunity to ask questions. All of the patient's questions were answered in detail. The patient voiced understanding of the care plan.        Sincerely,       Timmy Callaway MD   CC:   No Recipients

## (undated) NOTE — LETTER
23    Patient: Fco Rothman  : 1/15/1985 Visit date: 2023    Dear  Yannick Piedra DO    Thank you for referring Fco Rothman to my practice. Please find my assessment and plan below. Assessment   1. Perianal abscess        Plan     The patient is recovering nicely following incision and drainage perianal abscess in the office last week. The anticipated postoperative recovery was discussed with the patient in detail. Dietary, activity, and exercise recommendations along with restrictions were discussed with the patient during today's visit. Wound care instructions were discussed during today's visit. The patient will return to my attention in 2 weeks for continued care. The patient is encouraged to continue seeing the primary care physician for ongoing medical needs. The patient was given ample opportunity to ask questions. The patient's questions were answered in detail and to the patient's satisfaction. The patient voiced understanding of the postoperative care plan. Assessment   No diagnosis found.       Plan         Sincerely,       You Guthrie MD   CC:   No Recipients